# Patient Record
Sex: FEMALE | Race: WHITE | NOT HISPANIC OR LATINO | Employment: OTHER | URBAN - NONMETROPOLITAN AREA
[De-identification: names, ages, dates, MRNs, and addresses within clinical notes are randomized per-mention and may not be internally consistent; named-entity substitution may affect disease eponyms.]

---

## 2017-01-24 ENCOUNTER — OFFICE VISIT (OUTPATIENT)
Dept: ORTHOPEDIC SURGERY | Facility: CLINIC | Age: 67
End: 2017-01-24

## 2017-01-24 DIAGNOSIS — M81.0 OSTEOPOROSIS: Primary | ICD-10-CM

## 2017-01-24 PROCEDURE — 96372 THER/PROPH/DIAG INJ SC/IM: CPT | Performed by: ORTHOPAEDIC SURGERY

## 2017-01-24 NOTE — MR AVS SNAPSHOT
Ayleen Ruiz   1/24/2017 8:00 AM   Office Visit    Dept Phone:  [phone]   Encounter #:  [id number]    Provider:  Giuseppe Sheridan MD   Department:  [facility] ORTHOPEDICS                Your Full Care Plan              Today's Medication Changes          These changes are accurate as of: 1/24/17  8:46 AM.  If you have any questions, ask your nurse or doctor.               Stop taking medication(s)listed here:     DENOSUMAB SC   Stopped by:  Giuseppe Sheridan MD                      Your Updated Medication List          This list is accurate as of: 1/24/17  8:46 AM.  Always use your most recent med list.                ATORVASTATIN CALCIUM PO       GLUCOSAMINE-VITAMIN D PO       VITAMIN D3 PO               You Were Diagnosed With        Codes Comments    Osteoporosis    -  Primary ICD-10-CM: M81.0  ICD-9-CM: 733.00       Medications to be Given to You by a Medical Professional     Due       Frequency    (none) denosumab (PROLIA) syringe 60 mg  Once      Instructions     None    Patient Instructions History      Upcoming Appointments     Visit Type Date Time Department    FOLLOW UP 1/24/2017  8:00 AM [facility]    OFFICE VISIT 7/13/2017  1:00 PM [facility] OPHTHALMOLOGY [facility]    INJECTION 7/27/2017  8:00 AM [facility]      MyChart Signup     Our records indicate that you have declined [facility]. If you would like to sign up for [facility], please email [facility] [email or web address] or call [phone] to obtain an activation code.             Other Info from Your Visit           Your Appointments     Jul 13, 2017  1:00 PM CDT   Office Visit with Bubba Browning MD   [facility] OPHTHALMOLOGY (--)    [address] [place] [address] Dr  [facility] [address] [place] [id number] [phone]           Arrive 15 minutes prior to appointment.            Jul 27, 2017  8:00 AM CDT   Injection with Giuseppe Juárez  MD Fatimah   [facility] ORTHOPEDICS (--)    [address]  [facility] [address] [phone]              Allergies     No Known Allergies      Reason for Visit     Injections prolia injection      Vital Signs     Smoking Status                   Never Smoker           Problems and Diagnoses Noted     Derangement of meniscus of knee    Loose body of knee    Osteoporosis    High cholesterol      Medications Administered     denosumab (PROLIA) syringe 60 mg

## 2017-01-24 NOTE — PROGRESS NOTES
Chief Complaint   Patient presents with   • Injections     prolia injection     Tracey Calles MD    HPI:  Patient returns for a prolia injection.  No new complaints    Scheduled Meds:  Continuous Infusions:  No current facility-administered medications for this visit.   PRN Meds:.  No Known Allergies    Review of Systems   Constitutional: Negative.    HENT: Negative.    Eyes: Negative.    Respiratory: Negative.    Cardiovascular: Negative.    Gastrointestinal: Negative.    Endocrine: Negative.    Genitourinary: Negative.    Musculoskeletal: Negative.    Skin: Negative.    Allergic/Immunologic: Negative.    Neurological: Negative.    Hematological: Negative.    Psychiatric/Behavioral: Negative.          There were no vitals filed for this visit.    Physical Exam    Bone Density 7/14/16   T Score   Hip -2.5  Spine -1.2      ASSESSMENT:    Diagnoses and all orders for this visit:    Osteoporosis  -     denosumab (PROLIA) syringe 60 mg; Inject 1 mL under the skin 1 (One) Time.        Plan:    The patient was injected, under aseptic conditions, into the:    []  Right arm    []  Left arm  [x]  Right thigh    []  Left thigh  []  Right side of abdomen  []  Left side of abdomen    The patient tolerated the procedure well.  Plan Followup in 6 months for next injection.    Return in about 6 months (around 7/24/2017) for prolia injection.  will need a new bone density at next visit..    Giuseppe Sheridan MD      [id number]

## 2017-02-07 DIAGNOSIS — M81.0 OSTEOPOROSIS, UNSPECIFIED: Primary | ICD-10-CM

## 2017-07-27 ENCOUNTER — CLINICAL SUPPORT (OUTPATIENT)
Dept: ORTHOPEDIC SURGERY | Facility: CLINIC | Age: 67
End: 2017-07-27

## 2017-07-27 VITALS — WEIGHT: 194 LBS | HEIGHT: 70 IN | BODY MASS INDEX: 27.77 KG/M2

## 2017-07-27 DIAGNOSIS — M81.0 OSTEOPOROSIS: Primary | ICD-10-CM

## 2017-07-27 PROCEDURE — 96372 THER/PROPH/DIAG INJ SC/IM: CPT | Performed by: ORTHOPAEDIC SURGERY

## 2017-07-27 NOTE — PROGRESS NOTES
"Chief Complaint   Patient presents with   • Follow-up     osteoporosis   • Injections     prolia     Tracey Calles MD    HPI:  Patient returns for a prolia injection.  No new complaints      Current Outpatient Prescriptions:   •  ATORVASTATIN CALCIUM PO, Take  by mouth., Disp: , Rfl:   •  Cholecalciferol (VITAMIN D3 PO), Take  by mouth. vit D3 & K-berberine-hops, Disp: , Rfl:   •  GLUCOSAMINE-VITAMIN D PO, Take  by mouth. glucosamine-D3-boswellia serr, Disp: , Rfl:   No current facility-administered medications for this visit.     No Known Allergies      Vitals:    07/27/17 0756   Weight: 194 lb (88 kg)   Height: 70\" (177.8 cm)         ASSESSMENT:    Diagnoses and all orders for this visit:    Osteoporosis  -     denosumab (PROLIA) syringe 60 mg; Inject 1 mL under the skin 1 (One) Time.    Bone Density Exam done 7/24/17   Results:   L spine -0.9  (-1.2, -1.6)  Left Hip -3.0 (-2.5, -2.5)    Plan:    Reviewed bone density result with patient.  Discussed healthy lifestyle options.  Continue activity as tolerated.    The patient was injected, under aseptic conditions, into the:    []  Right arm    []  Left arm  [x]  Right thigh    []  Left thigh  []  Right side of abdomen  []  Left side of abdomen    The patient tolerated the procedure well.  Plan Followup in 6 months for next injection.    NDC #: 61356-713-62      [id number]    "

## 2017-08-30 ENCOUNTER — CONSULT (OUTPATIENT)
Dept: SURGERY | Facility: CLINIC | Age: 67
End: 2017-08-30

## 2017-08-30 VITALS
WEIGHT: 202 LBS | BODY MASS INDEX: 28.92 KG/M2 | SYSTOLIC BLOOD PRESSURE: 138 MMHG | HEIGHT: 70 IN | DIASTOLIC BLOOD PRESSURE: 70 MMHG

## 2017-08-30 DIAGNOSIS — R92.8 ABNORMAL MAMMOGRAM OF LEFT BREAST: Primary | ICD-10-CM

## 2017-08-30 PROCEDURE — 99203 OFFICE O/P NEW LOW 30 MIN: CPT | Performed by: SURGERY

## 2017-08-30 RX ORDER — CLOTRIMAZOLE AND BETAMETHASONE DIPROPIONATE 10; .64 MG/G; MG/G
CREAM TOPICAL AS NEEDED
COMMUNITY
Start: 2017-08-15 | End: 2018-08-07

## 2017-08-30 RX ORDER — FLUCONAZOLE 150 MG/1
1 TABLET ORAL AS NEEDED
Refills: 5 | COMMUNITY
Start: 2017-08-15 | End: 2021-03-27

## 2017-08-30 NOTE — PROGRESS NOTES
Chief Complaint   Patient presents with   • Follow-up     Recheck abnormal breast and mammogram.        HPI  Abnormal LEFT side. No newly palpable masses, skin dimpling, nipple discharge, axillary adenopathy, breast pain.    Study Result      PROCEDURE: Left unilateral diagnostic mammogram with 3-D  tomosynthesis with CAD and left breast ultrasound     REASON FOR EXAM: Abnormal screening mammography     FINDINGS: Comparison study dated 8/23/2017, 8/25/2016, 2/16/2016,  8/6/2015, and 7/10/2014.Almost complete fatty involutional  changes of the left breast. Patient presents for workup of left  breast small asymmetric density with associated calcifications  involving the upper inner quadrant.. The lesion was further  characterized with spot mag imaging. On spot mag imaging, the  calcifications appear pleomorphic and are fairly tightly  clustered. This lesion was further evaluated with ultrasound. On  ultrasound the lesion is seen to represent a small vague  hypoechoic solid lesion with mild acoustic shadowing which  measures 0.6 x 0.37 x 0.65 cm.     Parenchymal pattern: Almost complete fatty involutional changes     IMPRESSION:  Left breast upper inner quadrant asymmetric density with  associated pleomorphic calcifications which would be suspicious  for possible malignancy. Recommend biopsy to further evaluate.  Patient was notified of findings and requests appointment at  outside facility.     BI-RADS category 4: Suspicious abnormality     Recommendation: Stereotactic  biopsy.      Electronically signed by:  Constantino Renteria MD  8/29/2017 3:49 PM CDT  Workstation: [id number]       Past Medical History:   Diagnosis Date   • Acute bronchitis    • Acute maxillary sinusitis    • Acute pharyngitis    • Allergic rhinitis    • Astigmatism    • Derangement of meniscus 9/19/2009   • Food allergy     spinach   • Hypermetropia    • Loose body of knee 9/19/2009   • Need for immunization against influenza    • Osteoporosis 7/10/2014   •  Presbyopia    • Vulvovaginitis        Past Surgical History:   Procedure Laterality Date   • LAPAROSCOPIC CHOLECYSTECTOMY     • WRIST SURGERY           Current Outpatient Prescriptions:   •  ATORVASTATIN CALCIUM PO, Take  by mouth., Disp: , Rfl:   •  Cholecalciferol (VITAMIN D3 PO), Take  by mouth. vit D3 & K-berberine-hops, Disp: , Rfl:   •  GLUCOSAMINE-VITAMIN D PO, Take  by mouth. glucosamine-D3-boswellia serr, Disp: , Rfl:   •  Multiple Vitamins-Minerals (MULTIVITAMIN ADULT PO), Take 1 tablet by mouth Daily., Disp: , Rfl:   •  clotrimazole-betamethasone (LOTRISONE) 1-0.05 % cream, Apply  topically As Needed., Disp: , Rfl:   •  fluconazole (DIFLUCAN) 150 MG tablet, Take 1 tablet by mouth As Needed., Disp: , Rfl: 5    No Known Allergies    Family History   Problem Relation Age of Onset   • Breast cancer Maternal Aunt        Social History     Social History   • Marital status:      Spouse name: N/A   • Number of children: N/A   • Years of education: N/A     Occupational History   • Not on file.     Social History Main Topics   • Smoking status: Never Smoker   • Smokeless tobacco: Never Used   • Alcohol use Not on file   • Drug use: Not on file   • Sexual activity: Not on file     Other Topics Concern   • Not on file     Social History Narrative       Review of Systems   Constitutional: Negative for appetite change, chills, fever and unexpected weight change.   HENT: Negative for hearing loss, nosebleeds and trouble swallowing.    Eyes: Negative for visual disturbance.   Respiratory: Negative for apnea, cough, choking, chest tightness, shortness of breath, wheezing and stridor.    Cardiovascular: Negative for chest pain, palpitations and leg swelling.   Gastrointestinal: Negative for abdominal distention, abdominal pain, blood in stool, constipation, diarrhea, nausea and vomiting.   Endocrine: Negative for cold intolerance, heat intolerance, polydipsia, polyphagia and polyuria.   Genitourinary: Negative for  difficulty urinating, dysuria, frequency, hematuria and urgency.   Musculoskeletal: Negative for arthralgias, back pain, myalgias and neck pain.   Skin: Negative for color change, pallor and rash.   Allergic/Immunologic: Negative for immunocompromised state.   Neurological: Negative for dizziness, seizures, syncope, light-headedness, numbness and headaches.   Hematological: Negative for adenopathy.   Psychiatric/Behavioral: Negative for suicidal ideas. The patient is not nervous/anxious.        Physical Exam   Constitutional: She appears well-developed and well-nourished.   HENT:   Head: Normocephalic and atraumatic.   Eyes: EOM are normal. Pupils are equal, round, and reactive to light.   Neck: Normal range of motion. Neck supple. No JVD present. No tracheal deviation present. No thyromegaly present.   Cardiovascular: Normal rate and regular rhythm.    Pulmonary/Chest: Effort normal and breath sounds normal. No stridor. No respiratory distress. She has no wheezes. She has no rales. Right breast exhibits no inverted nipple, no mass, no nipple discharge, no skin change and no tenderness. Left breast exhibits no inverted nipple, no mass, no nipple discharge, no skin change and no tenderness. Breasts are symmetrical.   Lymphadenopathy:     She has no cervical adenopathy.     She has no axillary adenopathy.   Skin: Skin is warm, dry and intact. No lesion noted. No erythema.   Psychiatric: She has a normal mood and affect. Her speech is normal and behavior is normal. Judgment and thought content normal. Cognition and memory are normal.   Vitals reviewed.        ASSESSMENT    Ayleen was seen today for follow-up.    Diagnoses and all orders for this visit:    Abnormal mammogram of left breast  Comments:  Microcalcifications  Orders:  -     Mammo Stereotactic Breast Biopsy Surgical 1st Left; Future        PLAN    1. LEFT stereotactic mammotome    The procedure is explained to the patient. The patient will be placed prone  for the procedure. A small incision will be made under local anesthesia, and a special, large needle will be used to perform the biopsy under ultrasound guidance.   A permanent titanium clip will be placed in the breast to barrett the site of biopsy should further surgery be necessary.  The risks of bleeding/bruising, infection, the possible need for open biopsy or other procedure, scarring, and poor wound healing are explained. There is a low transfusion risk. The risks of chronic pain are, likewise, low.   Alternatives include open biopsy in the operating room under local anesthesia with moderate sedation or general anesthesia or simply watching the lesion to see if there is change. These are not currently suggested.    She understands and agrees to the procedure.          This document has been electronically signed by Sabas Silvestre MD on August 30, 2017 10:22 AM

## 2017-09-08 ENCOUNTER — HOSPITAL ENCOUNTER (OUTPATIENT)
Dept: MAMMOGRAPHY | Facility: HOSPITAL | Age: 67
Discharge: HOME | End: 2017-09-08
Admitting: SURGERY

## 2017-09-08 VITALS
DIASTOLIC BLOOD PRESSURE: 68 MMHG | BODY MASS INDEX: 28.12 KG/M2 | OXYGEN SATURATION: 98 % | SYSTOLIC BLOOD PRESSURE: 146 MMHG | TEMPERATURE: 97.1 F | HEART RATE: 85 BPM | RESPIRATION RATE: 18 BRPM | WEIGHT: 196.43 LBS | HEIGHT: 70 IN

## 2017-09-08 DIAGNOSIS — R92.8 ABNORMAL MAMMOGRAM OF LEFT BREAST: ICD-10-CM

## 2017-09-08 PROCEDURE — A4648 IMPLANTABLE TISSUE MARKER: HCPCS

## 2017-09-08 PROCEDURE — 63710000001 DIAZEPAM 5 MG TABLET: Performed by: SURGERY

## 2017-09-08 PROCEDURE — 88305 TISSUE EXAM BY PATHOLOGIST: CPT | Performed by: SURGERY

## 2017-09-08 PROCEDURE — 88305 TISSUE EXAM BY PATHOLOGIST: CPT | Performed by: PATHOLOGY

## 2017-09-08 PROCEDURE — A9270 NON-COVERED ITEM OR SERVICE: HCPCS | Performed by: SURGERY

## 2017-09-08 PROCEDURE — 63710000001 OXYCODONE-ACETAMINOPHEN 5-325 MG TABLET: Performed by: SURGERY

## 2017-09-08 PROCEDURE — 19081 BX BREAST 1ST LESION STRTCTC: CPT | Performed by: SURGERY

## 2017-09-08 RX ORDER — DIPHENHYDRAMINE HCL 25 MG
25 CAPSULE ORAL NIGHTLY
COMMUNITY

## 2017-09-08 RX ORDER — CHLORPHENIRAMINE MALEATE 4 MG/1
4 TABLET ORAL DAILY
COMMUNITY

## 2017-09-08 RX ORDER — OXYCODONE HYDROCHLORIDE AND ACETAMINOPHEN 5; 325 MG/1; MG/1
1-2 TABLET ORAL ONCE
Status: CANCELLED | OUTPATIENT
Start: 2017-09-08

## 2017-09-08 RX ORDER — OXYCODONE HYDROCHLORIDE AND ACETAMINOPHEN 5; 325 MG/1; MG/1
1 TABLET ORAL ONCE
Status: COMPLETED | OUTPATIENT
Start: 2017-09-08 | End: 2017-09-08

## 2017-09-08 RX ORDER — LIDOCAINE HYDROCHLORIDE 10 MG/ML
20 INJECTION, SOLUTION INFILTRATION; PERINEURAL ONCE
Status: COMPLETED | OUTPATIENT
Start: 2017-09-08 | End: 2017-09-08

## 2017-09-08 RX ORDER — DIAZEPAM 5 MG/1
5 TABLET ORAL ONCE
Status: CANCELLED | OUTPATIENT
Start: 2017-09-08

## 2017-09-08 RX ORDER — DIAZEPAM 5 MG/1
5 TABLET ORAL ONCE
Status: COMPLETED | OUTPATIENT
Start: 2017-09-08 | End: 2017-09-08

## 2017-09-08 RX ADMIN — LIDOCAINE HYDROCHLORIDE 20 ML: 10 INJECTION, SOLUTION INFILTRATION; PERINEURAL at 07:41

## 2017-09-08 RX ADMIN — OXYCODONE HYDROCHLORIDE AND ACETAMINOPHEN 1 TABLET: 5; 325 TABLET ORAL at 06:01

## 2017-09-08 RX ADMIN — DIAZEPAM 5 MG: 5 TABLET ORAL at 06:01

## 2017-09-08 NOTE — PLAN OF CARE
Problem: Patient Care Overview (Adult)  Goal: Plan of Care Review  Outcome: Outcome(s) achieved Date Met:  09/08/17  Discharge criteria met

## 2017-09-08 NOTE — H&P (VIEW-ONLY)
Chief Complaint   Patient presents with   • Follow-up     Recheck abnormal breast and mammogram.        HPI  Abnormal LEFT side. No newly palpable masses, skin dimpling, nipple discharge, axillary adenopathy, breast pain.    Study Result      PROCEDURE: Left unilateral diagnostic mammogram with 3-D  tomosynthesis with CAD and left breast ultrasound     REASON FOR EXAM: Abnormal screening mammography     FINDINGS: Comparison study dated 8/23/2017, 8/25/2016, 2/16/2016,  8/6/2015, and 7/10/2014.Almost complete fatty involutional  changes of the left breast. Patient presents for workup of left  breast small asymmetric density with associated calcifications  involving the upper inner quadrant.. The lesion was further  characterized with spot mag imaging. On spot mag imaging, the  calcifications appear pleomorphic and are fairly tightly  clustered. This lesion was further evaluated with ultrasound. On  ultrasound the lesion is seen to represent a small vague  hypoechoic solid lesion with mild acoustic shadowing which  measures 0.6 x 0.37 x 0.65 cm.     Parenchymal pattern: Almost complete fatty involutional changes     IMPRESSION:  Left breast upper inner quadrant asymmetric density with  associated pleomorphic calcifications which would be suspicious  for possible malignancy. Recommend biopsy to further evaluate.  Patient was notified of findings and requests appointment at  outside facility.     BI-RADS category 4: Suspicious abnormality     Recommendation: Stereotactic  biopsy.      Electronically signed by:  Constantino Renteria MD  8/29/2017 3:49 PM CDT  Workstation: [id number]       Past Medical History:   Diagnosis Date   • Acute bronchitis    • Acute maxillary sinusitis    • Acute pharyngitis    • Allergic rhinitis    • Astigmatism    • Derangement of meniscus 9/19/2009   • Food allergy     spinach   • Hypermetropia    • Loose body of knee 9/19/2009   • Need for immunization against influenza    • Osteoporosis 7/10/2014   •  Presbyopia    • Vulvovaginitis        Past Surgical History:   Procedure Laterality Date   • LAPAROSCOPIC CHOLECYSTECTOMY     • WRIST SURGERY           Current Outpatient Prescriptions:   •  ATORVASTATIN CALCIUM PO, Take  by mouth., Disp: , Rfl:   •  Cholecalciferol (VITAMIN D3 PO), Take  by mouth. vit D3 & K-berberine-hops, Disp: , Rfl:   •  GLUCOSAMINE-VITAMIN D PO, Take  by mouth. glucosamine-D3-boswellia serr, Disp: , Rfl:   •  Multiple Vitamins-Minerals (MULTIVITAMIN ADULT PO), Take 1 tablet by mouth Daily., Disp: , Rfl:   •  clotrimazole-betamethasone (LOTRISONE) 1-0.05 % cream, Apply  topically As Needed., Disp: , Rfl:   •  fluconazole (DIFLUCAN) 150 MG tablet, Take 1 tablet by mouth As Needed., Disp: , Rfl: 5    No Known Allergies    Family History   Problem Relation Age of Onset   • Breast cancer Maternal Aunt        Social History     Social History   • Marital status:      Spouse name: N/A   • Number of children: N/A   • Years of education: N/A     Occupational History   • Not on file.     Social History Main Topics   • Smoking status: Never Smoker   • Smokeless tobacco: Never Used   • Alcohol use Not on file   • Drug use: Not on file   • Sexual activity: Not on file     Other Topics Concern   • Not on file     Social History Narrative       Review of Systems   Constitutional: Negative for appetite change, chills, fever and unexpected weight change.   HENT: Negative for hearing loss, nosebleeds and trouble swallowing.    Eyes: Negative for visual disturbance.   Respiratory: Negative for apnea, cough, choking, chest tightness, shortness of breath, wheezing and stridor.    Cardiovascular: Negative for chest pain, palpitations and leg swelling.   Gastrointestinal: Negative for abdominal distention, abdominal pain, blood in stool, constipation, diarrhea, nausea and vomiting.   Endocrine: Negative for cold intolerance, heat intolerance, polydipsia, polyphagia and polyuria.   Genitourinary: Negative for  difficulty urinating, dysuria, frequency, hematuria and urgency.   Musculoskeletal: Negative for arthralgias, back pain, myalgias and neck pain.   Skin: Negative for color change, pallor and rash.   Allergic/Immunologic: Negative for immunocompromised state.   Neurological: Negative for dizziness, seizures, syncope, light-headedness, numbness and headaches.   Hematological: Negative for adenopathy.   Psychiatric/Behavioral: Negative for suicidal ideas. The patient is not nervous/anxious.        Physical Exam   Constitutional: She appears well-developed and well-nourished.   HENT:   Head: Normocephalic and atraumatic.   Eyes: EOM are normal. Pupils are equal, round, and reactive to light.   Neck: Normal range of motion. Neck supple. No JVD present. No tracheal deviation present. No thyromegaly present.   Cardiovascular: Normal rate and regular rhythm.    Pulmonary/Chest: Effort normal and breath sounds normal. No stridor. No respiratory distress. She has no wheezes. She has no rales. Right breast exhibits no inverted nipple, no mass, no nipple discharge, no skin change and no tenderness. Left breast exhibits no inverted nipple, no mass, no nipple discharge, no skin change and no tenderness. Breasts are symmetrical.   Lymphadenopathy:     She has no cervical adenopathy.     She has no axillary adenopathy.   Skin: Skin is warm, dry and intact. No lesion noted. No erythema.   Psychiatric: She has a normal mood and affect. Her speech is normal and behavior is normal. Judgment and thought content normal. Cognition and memory are normal.   Vitals reviewed.        ASSESSMENT    Ayleen was seen today for follow-up.    Diagnoses and all orders for this visit:    Abnormal mammogram of left breast  Comments:  Microcalcifications  Orders:  -     Mammo Stereotactic Breast Biopsy Surgical 1st Left; Future        PLAN    1. LEFT stereotactic mammotome    The procedure is explained to the patient. The patient will be placed prone  for the procedure. A small incision will be made under local anesthesia, and a special, large needle will be used to perform the biopsy under ultrasound guidance.   A permanent titanium clip will be placed in the breast to barrett the site of biopsy should further surgery be necessary.  The risks of bleeding/bruising, infection, the possible need for open biopsy or other procedure, scarring, and poor wound healing are explained. There is a low transfusion risk. The risks of chronic pain are, likewise, low.   Alternatives include open biopsy in the operating room under local anesthesia with moderate sedation or general anesthesia or simply watching the lesion to see if there is change. These are not currently suggested.    She understands and agrees to the procedure.          This document has been electronically signed by Sabas Silvestre MD on August 30, 2017 10:22 AM

## 2017-09-11 LAB
LAB AP CASE REPORT: NORMAL
Lab: NORMAL
PATH REPORT.FINAL DX SPEC: NORMAL
PATH REPORT.GROSS SPEC: NORMAL

## 2017-09-15 ENCOUNTER — OFFICE VISIT (OUTPATIENT)
Dept: SURGERY | Facility: CLINIC | Age: 67
End: 2017-09-15

## 2017-09-15 VITALS
SYSTOLIC BLOOD PRESSURE: 120 MMHG | DIASTOLIC BLOOD PRESSURE: 80 MMHG | HEIGHT: 70 IN | BODY MASS INDEX: 28.63 KG/M2 | WEIGHT: 200 LBS

## 2017-09-15 DIAGNOSIS — R92.0 MICROCALCIFICATION OF LEFT BREAST ON MAMMOGRAM: Primary | ICD-10-CM

## 2017-09-15 PROCEDURE — 99212 OFFICE O/P EST SF 10 MIN: CPT | Performed by: SURGERY

## 2017-09-15 NOTE — PROGRESS NOTES
Chief Complaint   Patient presents with   • Follow-up     Recheck left breast mammotone 9-8-17.        HPI  Final Diagnosis   LEFT BREAST, MAMMOTOME BIOPSY:  FIBROADENOMATOID CHANGE WITH STROMAL MICROCALCIFICATIONS.   Electronically signed by Mauricio Bruno MD on 9/11/2017 at 1003   Gross Description       Multiple large bore needle biopsies of fatty tissue measure 3.0 x 7.5 x 0.4 cm, together.  This includes some blood clot.  Totally submitted in five blocks.                 Current Outpatient Prescriptions:   •  ATORVASTATIN CALCIUM PO, Take 20 tablets by mouth Every Night., Disp: , Rfl:   •  B Complex-C-E-Zn (B COMPLEX-C-E-ZINC) tablet, Take 1 tablet by mouth Daily., Disp: , Rfl:   •  chlorpheniramine (CHLOR-TRIMETON) 4 MG tablet, Take 4 mg by mouth Daily., Disp: , Rfl:   •  Cholecalciferol (VITAMIN D3 PO), Take 1 tablet by mouth. vit D3 & K-berberine-hops  , Disp: , Rfl:   •  clotrimazole-betamethasone (LOTRISONE) 1-0.05 % cream, Apply  topically As Needed., Disp: , Rfl:   •  denosumab (PROLIA) 60 MG/ML solution syringe, Inject  under the skin 2 (Two) Times a Day With Meals. Twice yearly, Disp: , Rfl:   •  diphenhydrAMINE (BENADRYL) 25 mg capsule, Take 25 mg by mouth Every Night., Disp: , Rfl:   •  fluconazole (DIFLUCAN) 150 MG tablet, Take 1 tablet by mouth As Needed., Disp: , Rfl: 5  •  GLUCOSAMINE-VITAMIN D PO, Take 1 tablet by mouth 2 (Two) Times a Day. glucosamine-D3-boswellia serr , Disp: , Rfl:   •  Multiple Vitamins-Minerals (MULTIVITAMIN ADULT PO), Take 1 tablet by mouth Daily., Disp: , Rfl:     No Known Allergies    Review of Systems  Doing well- no complaints. No pain, no hematoma  Physical Exam   Pulmonary/Chest:             ASSESSMENT    Ayleen was seen today for follow-up.    Diagnoses and all orders for this visit:    Microcalcification of left breast on mammogram  -     Mammo Diagnostic Left With CAD; Future        PLAN    1. Recheck in 6 weeks  2. Mammogram in 6 weeks          This document has  been electronically signed by Sabas Silvestre MD on September 15, 2017 9:15 AM

## 2017-10-02 NOTE — PROGRESS NOTES
Patient has been notified that future injections for prolia injections will be done through the [facility] center.

## 2017-10-11 ENCOUNTER — TELEPHONE (OUTPATIENT)
Dept: ORTHOPEDIC SURGERY | Facility: CLINIC | Age: 67
End: 2017-10-11

## 2017-11-01 ENCOUNTER — OFFICE VISIT (OUTPATIENT)
Dept: SURGERY | Facility: CLINIC | Age: 67
End: 2017-11-01

## 2017-11-01 VITALS
DIASTOLIC BLOOD PRESSURE: 70 MMHG | HEIGHT: 70 IN | WEIGHT: 209 LBS | SYSTOLIC BLOOD PRESSURE: 130 MMHG | BODY MASS INDEX: 29.92 KG/M2

## 2017-11-01 DIAGNOSIS — R92.8 ABNORMAL MAMMOGRAM OF LEFT BREAST: Primary | ICD-10-CM

## 2017-11-01 PROCEDURE — 99213 OFFICE O/P EST LOW 20 MIN: CPT | Performed by: SURGERY

## 2017-11-01 NOTE — PROGRESS NOTES
Chief Complaint   Patient presents with   • Follow-up     Recheck left breast and mammograms.        HPI  6 week recheck after mammotome bx:  Final Diagnosis   LEFT BREAST, MAMMOTOME BIOPSY:  FIBROADENOMATOID CHANGE WITH STROMAL MICROCALCIFICATIONS.   Electronically signed by Mauricio Bruno MD on 9/11/2017 at 1003   Gross Description         Multiple large bore needle biopsies of fatty tissue measure 3.0 x 7.5 x 0.4 cm, together.  This includes some blood clot.  Totally submitted in five blocks   Doing well- no palpable masses or pain.    Study Result      PROCEDURE: Digital diagnostic mammogram of the left breast.     HISTORY: Postbiopsy follow-up     COMPARISON: 8/29/2017, 8/23/2017     NOTE:   Computer-aided detection was utilized during this exam.   Digital breast tomosynthesis was performed.     FINDINGS: CC and MLO views were obtained of the left breast  demonstrating a microclip in area of the previously noted  calcifications. The calcifications are no longer visualized.  There is a microclip with an irregular shaped surrounding soft  soft tissue, likely representing a hematoma.     IMPRESSION:  CONCLUSION:    1. Postbiopsy follow-up demonstrating a microclip and an  irregular shaped soft tissue hematoma. Recommend six month  follow-up left breast mammogram to ensure resolution.     2. BI-RADS Category 3:  Probably benign findings. Initial short  interval follow-up suggested.     Electronically signed by:  Fernando Patino MD  10/30/2017 9:48 AM  CDT Workstation: [id number]     ( I have personally reviewed the breast imaging and concur with the findings of the radiologist- BiRADS 3)  Past Medical History:   Diagnosis Date   • Acute bronchitis    • Acute maxillary sinusitis    • Acute pharyngitis    • Allergic rhinitis    • Astigmatism    • Derangement of meniscus 9/19/2009   • Food allergy     spinach   • Hypermetropia    • Loose body of knee 9/19/2009   • Need for immunization against influenza    • Osteoporosis  7/10/2014   • Presbyopia    • Vulvovaginitis        Past Surgical History:   Procedure Laterality Date   • LAPAROSCOPIC CHOLECYSTECTOMY     • WRIST SURGERY           Current Outpatient Prescriptions:   •  ATORVASTATIN CALCIUM PO, Take 20 tablets by mouth Every Night., Disp: , Rfl:   •  B Complex-C-E-Zn (B COMPLEX-C-E-ZINC) tablet, Take 1 tablet by mouth Daily., Disp: , Rfl:   •  chlorpheniramine (CHLOR-TRIMETON) 4 MG tablet, Take 4 mg by mouth Daily., Disp: , Rfl:   •  Cholecalciferol (VITAMIN D3 PO), Take 1 tablet by mouth. vit D3 & K-berberine-hops  , Disp: , Rfl:   •  clotrimazole-betamethasone (LOTRISONE) 1-0.05 % cream, Apply  topically As Needed., Disp: , Rfl:   •  denosumab (PROLIA) 60 MG/ML solution syringe, Inject  under the skin 2 (Two) Times a Day With Meals. Twice yearly, Disp: , Rfl:   •  diphenhydrAMINE (BENADRYL) 25 mg capsule, Take 25 mg by mouth Every Night., Disp: , Rfl:   •  fluconazole (DIFLUCAN) 150 MG tablet, Take 1 tablet by mouth As Needed., Disp: , Rfl: 5  •  GLUCOSAMINE-VITAMIN D PO, Take 1 tablet by mouth 2 (Two) Times a Day. glucosamine-D3-boswellia serr , Disp: , Rfl:   •  Multiple Vitamins-Minerals (MULTIVITAMIN ADULT PO), Take 1 tablet by mouth Daily., Disp: , Rfl:     No Known Allergies    Family History   Problem Relation Age of Onset   • Breast cancer Maternal Aunt        Social History     Social History   • Marital status:      Spouse name: N/A   • Number of children: 2- age 36, 34     Social History Main Topics   • Smoking status: Former Smoker   • Smokeless tobacco: Never Used   • Alcohol use No   • Drug use: No         Review of Systems  Nothing to add  Physical Exam   Neck: Normal range of motion. Neck supple. No JVD present. No tracheal deviation present. No thyromegaly present.   Pulmonary/Chest: No stridor. Right breast exhibits no inverted nipple, no mass, no nipple discharge, no skin change and no tenderness. Left breast exhibits no inverted nipple, no mass, no  nipple discharge, no skin change and no tenderness. Breasts are symmetrical.       Lymphadenopathy:     She has no cervical adenopathy.     She has no axillary adenopathy.         ASSESSMENT    Ayleen was seen today for follow-up.    Diagnoses and all orders for this visit:    Abnormal mammogram of left breast      PLAN    1. Recheck in 6 month with LEFT mammogram          This document has been electronically signed by Sabas Silvestre MD on November 1, 2017 10:00 AM

## 2017-11-02 DIAGNOSIS — R92.8 ABNORMAL MAMMOGRAM: Primary | ICD-10-CM

## 2018-01-30 ENCOUNTER — INFUSION (OUTPATIENT)
Dept: ONCOLOGY | Facility: HOSPITAL | Age: 68
End: 2018-01-30

## 2018-01-30 DIAGNOSIS — M81.0 OSTEOPOROSIS, UNSPECIFIED OSTEOPOROSIS TYPE, UNSPECIFIED PATHOLOGICAL FRACTURE PRESENCE: Primary | ICD-10-CM

## 2018-01-30 LAB — CALCIUM SPEC-SCNC: 9.9 MG/DL (ref 8.4–10.2)

## 2018-01-30 PROCEDURE — 82310 ASSAY OF CALCIUM: CPT

## 2018-01-30 PROCEDURE — 96372 THER/PROPH/DIAG INJ SC/IM: CPT | Performed by: NURSE PRACTITIONER

## 2018-01-30 PROCEDURE — 36415 COLL VENOUS BLD VENIPUNCTURE: CPT | Performed by: NURSE PRACTITIONER

## 2018-01-30 PROCEDURE — 25010000002 DENOSUMAB 60 MG/ML SOLUTION: Performed by: ORTHOPAEDIC SURGERY

## 2018-01-30 RX ADMIN — DENOSUMAB 60 MG: 60 INJECTION SUBCUTANEOUS at 14:32

## 2018-03-16 ENCOUNTER — TELEPHONE (OUTPATIENT)
Dept: ORTHOPEDIC SURGERY | Facility: CLINIC | Age: 68
End: 2018-03-16

## 2018-05-08 ENCOUNTER — OFFICE VISIT (OUTPATIENT)
Dept: SURGERY | Facility: CLINIC | Age: 68
End: 2018-05-08

## 2018-05-08 VITALS
WEIGHT: 228.6 LBS | DIASTOLIC BLOOD PRESSURE: 60 MMHG | BODY MASS INDEX: 32.73 KG/M2 | HEIGHT: 70 IN | SYSTOLIC BLOOD PRESSURE: 120 MMHG

## 2018-05-08 DIAGNOSIS — R92.8 ABNORMAL MAMMOGRAM OF LEFT BREAST: Primary | ICD-10-CM

## 2018-05-08 PROCEDURE — 99213 OFFICE O/P EST LOW 20 MIN: CPT | Performed by: SURGERY

## 2018-05-08 RX ORDER — ATORVASTATIN CALCIUM 20 MG/1
20 TABLET, FILM COATED ORAL NIGHTLY
COMMUNITY
Start: 2018-03-02

## 2018-05-08 NOTE — PATIENT INSTRUCTIONS

## 2018-05-14 DIAGNOSIS — R92.8 ABNORMAL MAMMOGRAM: Primary | ICD-10-CM

## 2018-05-14 NOTE — PROGRESS NOTES
Chief Complaint: This is a 68 y.o. woman seen in consultation for abnormal breast imaging    History of Present Illness:  Patient has noted no new masses, skin changes, nipple discharge, nipple changes prior to her most recent imaging.  Her most recent imaging includes the following:  Study Result        PROCEDURE: Digital diagnostic mammogram of the left breast.     HISTORY: Six-month follow-up     COMPARISON: Prior exams dated 10/27/2017 through 7/10/2014     NOTE:   Computer-aided detection was utilized during this exam.   Digital breast tomosynthesis was performed.     FINDINGS: CC and MLO views were obtained of the left breast  demonstrating a microclip in the upper outer aspect, similar to  the prior exam. There is a surrounding spiculated density which  is only seen on the cc view, most likely representing hematoma or  scarring. The density appears smaller than on the prior exam.     IMPRESSION:  CONCLUSION:    1. Spiculated density surrounding the microclip in the upper  outer left breast, smaller than on the prior exam, most likely  representing a retracting scar or hematoma. Recommend six-month  follow-up at the time of yearly screening.     2. BI-RADS Category 3:  Probably benign findings. Initial short  interval follow-up suggested.     Electronically signed by:  Fernando Patino MD  5/1/2018 2:39 PM CDT  Workstation: [id number]         ( I have personally reviewed the breast imaging and concur with the findings of the radiologist- BiRADS 3)    Breast Biopsy History: Mammotome biopsy for benign disease 6 months ago. Here for follow up mammogram.  Breast Cancer HIstory: Patient does not have a past medical history of breast cancer.      Past Medical History:   Diagnosis Date   • Acute bronchitis    • Acute maxillary sinusitis    • Acute pharyngitis    • Allergic rhinitis    • Astigmatism    • Derangement of meniscus 9/19/2009   • Food allergy     spinach   • Hypermetropia    • Loose body of knee 9/19/2009   •  Need for immunization against influenza    • Osteoporosis 7/10/2014   • Presbyopia    • Vulvovaginitis      Past Surgical History:   Procedure Laterality Date   • LAPAROSCOPIC CHOLECYSTECTOMY     • WRIST SURGERY         Current Outpatient Prescriptions:   •  atorvastatin (LIPITOR) 20 MG tablet, , Disp: , Rfl:   •  B Complex-C-E-Zn (B COMPLEX-C-E-ZINC) tablet, Take 1 tablet by mouth Daily., Disp: , Rfl:   •  chlorpheniramine (CHLOR-TRIMETON) 4 MG tablet, Take 4 mg by mouth Daily., Disp: , Rfl:   •  Cholecalciferol (VITAMIN D3 PO), Take 1 tablet by mouth. vit D3 & K-berberine-hops  , Disp: , Rfl:   •  clotrimazole-betamethasone (LOTRISONE) 1-0.05 % cream, Apply  topically As Needed., Disp: , Rfl:   •  diphenhydrAMINE (BENADRYL) 25 mg capsule, Take 25 mg by mouth Every Night., Disp: , Rfl:   •  fluconazole (DIFLUCAN) 150 MG tablet, Take 1 tablet by mouth As Needed., Disp: , Rfl: 5  •  GLUCOSAMINE-VITAMIN D PO, Take 1 tablet by mouth 2 (Two) Times a Day. glucosamine-D3-boswellia serr , Disp: , Rfl:   •  Multiple Vitamins-Minerals (MULTIVITAMIN ADULT PO), Take 1 tablet by mouth Daily., Disp: , Rfl:   No Known Allergies  Family History   Problem Relation Age of Onset   • Breast cancer Maternal Aunt      Social History     Social History   • Marital status:      Spouse name: N/A   • Number of children: N/A   • Years of education: N/A     Occupational History   • Not on file.     Social History Main Topics   • Smoking status: Former Smoker   • Smokeless tobacco: Never Used   • Alcohol use Not on file   • Drug use: Unknown   • Sexual activity: Not on file     Other Topics Concern   • Not on file     Social History Narrative   • No narrative on file     Review of Systems   Constitutional: Negative for appetite change, chills, fever and unexpected weight change.   HENT: Negative for hearing loss, nosebleeds and trouble swallowing.    Eyes: Negative for visual disturbance.   Respiratory: Negative for apnea, cough,  choking, chest tightness, shortness of breath, wheezing and stridor.    Cardiovascular: Negative for chest pain, palpitations and leg swelling.   Gastrointestinal: Negative for abdominal distention, abdominal pain, blood in stool, constipation, diarrhea, nausea and vomiting.   Endocrine: Negative for cold intolerance, heat intolerance, polydipsia, polyphagia and polyuria.   Genitourinary: Negative for difficulty urinating, dysuria, frequency, hematuria and urgency.   Musculoskeletal: Negative for arthralgias, back pain, myalgias and neck pain.   Skin: Negative for color change, pallor and rash.   Allergic/Immunologic: Negative for immunocompromised state.   Neurological: Negative for dizziness, seizures, syncope, light-headedness, numbness and headaches.   Hematological: Negative for adenopathy.   Psychiatric/Behavioral: Negative for suicidal ideas. The patient is not nervous/anxious.      Physical Exam   Pulmonary/Chest: Right breast exhibits no inverted nipple, no mass, no nipple discharge, no skin change and no tenderness. Left breast exhibits no inverted nipple, no mass, no nipple discharge, no skin change and no tenderness. Breasts are symmetrical. There is no breast swelling.   Genitourinary: No breast tenderness, discharge or bleeding.   Lymphadenopathy:     She has no cervical adenopathy.     She has no axillary adenopathy.        Right: No supraclavicular adenopathy present.        Left: No supraclavicular adenopathy present.   Skin: Skin is warm, dry and intact. No lesion noted. No erythema.   Psychiatric: She has a normal mood and affect. Her speech is normal. Cognition and memory are normal.     Vitals:    05/08/18 1527   BP: 120/60     Assessment:    Ayleen was seen today for follow-up.    Diagnoses and all orders for this visit:    Abnormal mammogram of left breast        Plan:    1. Recheck in 6 months mammogram and exam        This document has been electronically signed by Sabas Silvestre MD on May  13, 2018 9:42 PM

## 2018-06-05 ENCOUNTER — TELEPHONE (OUTPATIENT)
Dept: ORTHOPEDIC SURGERY | Facility: CLINIC | Age: 68
End: 2018-06-05

## 2018-06-05 DIAGNOSIS — M81.0 OSTEOPOROSIS, UNSPECIFIED OSTEOPOROSIS TYPE, UNSPECIFIED PATHOLOGICAL FRACTURE PRESENCE: Primary | ICD-10-CM

## 2018-07-31 ENCOUNTER — LAB (OUTPATIENT)
Dept: ONCOLOGY | Facility: HOSPITAL | Age: 68
End: 2018-07-31

## 2018-07-31 DIAGNOSIS — M81.0 OSTEOPOROSIS, UNSPECIFIED OSTEOPOROSIS TYPE, UNSPECIFIED PATHOLOGICAL FRACTURE PRESENCE: Primary | ICD-10-CM

## 2018-07-31 PROCEDURE — 96372 THER/PROPH/DIAG INJ SC/IM: CPT

## 2018-07-31 PROCEDURE — 25010000002 DENOSUMAB 60 MG/ML SOLUTION: Performed by: ORTHOPAEDIC SURGERY

## 2018-07-31 RX ADMIN — DENOSUMAB 60 MG: 60 INJECTION SUBCUTANEOUS at 08:34

## 2018-08-07 ENCOUNTER — OFFICE VISIT (OUTPATIENT)
Dept: ORTHOPEDIC SURGERY | Facility: CLINIC | Age: 68
End: 2018-08-07

## 2018-08-07 VITALS — HEIGHT: 70 IN | WEIGHT: 214 LBS | BODY MASS INDEX: 30.64 KG/M2

## 2018-08-07 DIAGNOSIS — M81.0 OSTEOPOROSIS, UNSPECIFIED OSTEOPOROSIS TYPE, UNSPECIFIED PATHOLOGICAL FRACTURE PRESENCE: Primary | ICD-10-CM

## 2018-08-07 PROCEDURE — 99213 OFFICE O/P EST LOW 20 MIN: CPT | Performed by: ORTHOPAEDIC SURGERY

## 2018-08-07 NOTE — PROGRESS NOTES
"Ayleen Ruiz is a 68 y.o. female returns for     Chief Complaint   Patient presents with   • Osteoporosis     Follow up and bone density       HISTORY OF PRESENT ILLNESS: Patient being seen for one year follow up for osteoporosis. Bone density done 7/26/18. Patient currently taking prolia.        CONCURRENT MEDICAL HISTORY:    The following portions of the patient's history were reviewed and updated as appropriate: allergies, current medications, past family history, past medical history, past social history, past surgical history and problem list.     ROS  No fevers or chills.  No chest pain or shortness of air.  No GI or  disturbances.    PHYSICAL EXAMINATION:       Ht 177.8 cm (70\")   Wt 97.1 kg (214 lb)   BMI 30.71 kg/m²     Physical Exam   Constitutional: She is oriented to person, place, and time. She appears well-developed and well-nourished.   Neurological: She is alert and oriented to person, place, and time.   Psychiatric: She has a normal mood and affect. Her behavior is normal. Judgment and thought content normal.       GAIT:     [x]  Normal  []  Antalgic    Assistive device: [x]  None  []  Walker     []  Crutches  []  Cane     []  Wheelchair  []  Stretcher    Ortho Exam      Dexa Bone Density Axial    Result Date: 7/26/2018  Narrative: DEXA scan HISTORY: Osteoporosis screening postmenopausal Scans were obtained at the L1-L4 levels and of each hip. Average bone mineral density for the lumbar spine is 0.869 g/sq cm. The T score of -1.6 corresponds to a WHO of classification of osteopenia. The bone mineral density for the left femoral neck is 0.568 g/sq cm. The T score of -2.5 corresponds to a WHO classification of osteoporosis. Total left hip T score of -1.8 corresponds to osteopenia. The bone mineral density for the right femoral neck is 0.640 g/sq cm. The T score of -1.9 corresponds to a WHO classification of osteopenia. Total right hip T score of -1.8 also corresponds to osteopenia. "     Impression: CONCLUSION: Lumbar spine and right hip osteopenia. Fracture risk increased. Left femoral neck osteoporosis. Fracture risk high. [id number] Electronically signed by:  Don Perez MD  7/26/2018 10:25 AM CDT Workstation: [id number]            ASSESSMENT:    Diagnoses and all orders for this visit:    Osteoporosis, unspecified osteoporosis type, unspecified pathological fracture presence    Other orders  -     Ascorbic Acid (DOMINICK-C PO); Take  by mouth.  -     Multiple Vitamins-Minerals (ZINC PO); Take  by mouth.  -     Misc Natural Products (OSTEO BI-FLEX ADV DOUBLE ST PO); Take  by mouth.  -     denosumab (PROLIA) 60 MG/ML solution syringe; Inject  under the skin into the appropriate area as directed 1 (One) Time.          PLAN    Approximately 15 minutes was spent with the patient reviewing the bone scan results and prior bone scan results.  We discussed a healthy bone health lifestyle choices.  We discussed increased activity as well as calcium and vitamin D supplements.  Patient will follow up with the bone health clinic to help direct further treatment options.    Patient's Body mass index is 30.71 kg/m². BMI is above normal parameters. Recommendations include: exercise counseling and nutrition counseling.      Return if symptoms worsen or fail to improve, for recheck.    Giuseppe Sheridan MD

## 2018-11-26 ENCOUNTER — OFFICE VISIT (OUTPATIENT)
Dept: SURGERY | Facility: CLINIC | Age: 68
End: 2018-11-26

## 2018-11-26 VITALS
HEIGHT: 70 IN | DIASTOLIC BLOOD PRESSURE: 80 MMHG | HEART RATE: 81 BPM | SYSTOLIC BLOOD PRESSURE: 122 MMHG | WEIGHT: 199.8 LBS | TEMPERATURE: 98.3 F | BODY MASS INDEX: 28.6 KG/M2

## 2018-11-26 DIAGNOSIS — R92.8 ABNORMAL MAMMOGRAM OF LEFT BREAST: Primary | ICD-10-CM

## 2018-11-26 PROBLEM — Z87.898 HX OF ABNORMAL MAMMOGRAM: Status: ACTIVE | Noted: 2018-11-26

## 2018-11-26 PROCEDURE — 99213 OFFICE O/P EST LOW 20 MIN: CPT | Performed by: SURGERY

## 2018-11-26 RX ORDER — SCOLOPAMINE TRANSDERMAL SYSTEM 1 MG/1
1.5 PATCH, EXTENDED RELEASE TRANSDERMAL
COMMUNITY
Start: 2018-08-21 | End: 2019-08-22

## 2018-11-26 RX ORDER — ONDANSETRON 4 MG/1
TABLET, FILM COATED ORAL AS NEEDED
Refills: 1 | COMMUNITY
Start: 2018-08-21 | End: 2021-03-27

## 2018-11-26 NOTE — PROGRESS NOTES
Chief Complaint   Patient presents with   • Follow-up     Routine check on breast and mammogram.        HPI  6 month follow up abnormal mammogram. 2 aunts with breast cancer. No hx of personal breast cancer. No newly palpable masses, skin dimpling, nipple D/c axillary adenopathy.      Study Result        PROCEDURE: Bilateral diagnostic mammogram with 3-D tomosynthesis  with CAD     REASON FOR EXAM: Follow-up of suspected benign left breast  postbiopsy changes.     FINDINGS: Comparison study dated 5/1/2018, 10/27/2017, 8/23/2017,  a 20/5/2016, and 2/16/2016.Marked improvement in left breast  upper outer quadrant postbiopsy changes which have completely  resolved in the interim. Clip sosa site of biopsy. No suspicious  mass or malignant type microcalcifications . No skin thickening  or retraction. .     Parenchymal pattern: Almost complete fatty involutional change of  breast     IMPRESSION:  No mammographic evidence of malignancy.     BI-RADS category 2: Benign findings.     Recommendation: Annual mammography      Electronically signed by:  Constantino Renteria MD  11/16/2018 1:45 PM CST  Workstation: [id number]   ( I have personally reviewed the breast imaging and concur with the findings of the radiologist- BiRADS 2)    Past Medical History:   Diagnosis Date   • Acute bronchitis    • Acute maxillary sinusitis    • Acute pharyngitis    • Allergic rhinitis    • Astigmatism    • Derangement of meniscus 9/19/2009   • Food allergy     spinach   • Hypermetropia    • Loose body of knee 9/19/2009   • Need for immunization against influenza    • Osteoporosis 7/10/2014   • Presbyopia    • Vulvovaginitis        Past Surgical History:   Procedure Laterality Date   • BREAST BIOPSY     • LAPAROSCOPIC CHOLECYSTECTOMY     • WRIST SURGERY           Current Outpatient Medications:   •  Ascorbic Acid (DOMINICK-C PO), Take  by mouth., Disp: , Rfl:   •  Cholecalciferol (VITAMIN D3 PO), Take 1 tablet by mouth. vit D3 & K-berberine-hops  , Disp: , Rfl:    •  denosumab (PROLIA) 60 MG/ML solution syringe, Inject  under the skin into the appropriate area as directed 1 (One) Time., Disp: , Rfl:   •  denosumab (PROLIA) 60 MG/ML solution syringe, Inject 1 mL under the skin into the appropriate area as directed Every 6 (Six) Months., Disp: , Rfl:   •  diphenhydrAMINE (BENADRYL) 25 mg capsule, Take 25 mg by mouth Every Night., Disp: , Rfl:   •  Misc Natural Products (OSTEO BI-FLEX ADV DOUBLE ST PO), Take  by mouth., Disp: , Rfl:   •  Multiple Vitamins-Minerals (MULTIVITAMIN ADULT PO), Take 1 tablet by mouth Daily., Disp: , Rfl:   •  Multiple Vitamins-Minerals (ZINC PO), Take  by mouth., Disp: , Rfl:   •  Scopolamine (TRANSDERM-SCOP, 1.5 MG,) 1.5 MG/3DAYS patch, Place 1.5 mg on the skin as directed by provider., Disp: , Rfl:   •  atorvastatin (LIPITOR) 20 MG tablet, , Disp: , Rfl:   •  chlorpheniramine (CHLOR-TRIMETON) 4 MG tablet, Take 4 mg by mouth Daily., Disp: , Rfl:   •  fluconazole (DIFLUCAN) 150 MG tablet, Take 1 tablet by mouth As Needed., Disp: , Rfl: 5  •  Glucosamine-Chondroitin (GLUCOSAMINE CHONDR COMPLEX PO), Take  by mouth., Disp: , Rfl:   •  ondansetron (ZOFRAN) 4 MG tablet, As Needed., Disp: , Rfl: 1    No Known Allergies    Family History   Problem Relation Age of Onset   • Breast cancer Maternal Aunt        Social History     Socioeconomic History   • Marital status:      Spouse name: Not on file   • Number of children: Not on file   • Years of education: Not on file   • Highest education level: Not on file   Social Needs   • Financial resource strain: Not on file   • Food insecurity - worry: Not on file   • Food insecurity - inability: Not on file   • Transportation needs - medical: Not on file   • Transportation needs - non-medical: Not on file   Occupational History   • Not on file   Tobacco Use   • Smoking status: Former Smoker   • Smokeless tobacco: Never Used   Substance and Sexual Activity   • Alcohol use: Yes     Comment: occassionally   •  Drug use: No   • Sexual activity: Defer   Other Topics Concern   • Not on file   Social History Narrative   • Not on file       Review of Systems   Constitutional: Negative for appetite change, chills, fever and unexpected weight change.   HENT: Negative for hearing loss, nosebleeds and trouble swallowing.    Eyes: Negative for visual disturbance.   Respiratory: Negative for apnea, cough, choking, chest tightness, shortness of breath, wheezing and stridor.    Cardiovascular: Negative for chest pain, palpitations and leg swelling.   Gastrointestinal: Negative for abdominal distention, abdominal pain, blood in stool, constipation, diarrhea, nausea and vomiting.   Endocrine: Negative for cold intolerance, heat intolerance, polydipsia, polyphagia and polyuria.   Genitourinary: Negative for difficulty urinating, dysuria, frequency, hematuria and urgency.   Musculoskeletal: Negative for arthralgias, back pain, myalgias and neck pain.   Skin: Negative for color change, pallor and rash.   Allergic/Immunologic: Negative for immunocompromised state.   Neurological: Negative for dizziness, seizures, syncope, light-headedness, numbness and headaches.   Hematological: Negative for adenopathy.   Psychiatric/Behavioral: Negative for suicidal ideas. The patient is not nervous/anxious.        Physical Exam   Constitutional: She is oriented to person, place, and time. She appears well-developed and well-nourished. No distress.   HENT:   Head: Normocephalic and atraumatic.   Mouth/Throat: No oropharyngeal exudate.   Eyes: EOM are normal. Pupils are equal, round, and reactive to light. No scleral icterus.   Neck: Normal range of motion. Neck supple. No JVD present. No tracheal deviation present. No thyromegaly present.   Cardiovascular: Normal rate, regular rhythm and normal heart sounds.   Pulmonary/Chest: Effort normal and breath sounds normal. No stridor. No respiratory distress. She has no wheezes. She has no rales. She exhibits no  tenderness. Right breast exhibits no inverted nipple, no mass, no nipple discharge, no skin change and no tenderness. Left breast exhibits no inverted nipple, no mass, no nipple discharge, no skin change and no tenderness. Breasts are symmetrical. There is no breast swelling.   Genitourinary: No breast tenderness, discharge or bleeding.   Musculoskeletal: Normal range of motion. She exhibits no edema, tenderness or deformity.   Lymphadenopathy:     She has no cervical adenopathy.     She has no axillary adenopathy.   Neurological: She is alert and oriented to person, place, and time.   Skin: Skin is warm and dry. No rash noted. She is not diaphoretic. No erythema. No pallor.   Psychiatric: She has a normal mood and affect. Her behavior is normal. Judgment normal.   Vitals reviewed.        ASSESSMENT    Ayleen was seen today for follow-up.    Diagnoses and all orders for this visit:    Abnormal mammogram of left breast        PLAN    1. Recheck as needed  2. Yearly mammograms and exam because of risk factors              This document has been electronically signed by Sabas Silvestre MD on November 26, 2018 10:19 AM

## 2019-01-30 ENCOUNTER — TELEPHONE (OUTPATIENT)
Dept: ONCOLOGY | Facility: HOSPITAL | Age: 69
End: 2019-01-30

## 2019-01-30 NOTE — TELEPHONE ENCOUNTER
Called patient, explained that she is due for Prolia.  We have identified that we need an updated order for Prolia injection and forwarded her plan to Robert to update.  Patient states that she will be out of town for the next month.  Explained that we will call her to reschedule her Prolia once we receive an updated order and authorization has been established.  Verbal understanding by patient.  Claire Aviles RN  January 30, 2019  3:54 PM

## 2019-01-31 ENCOUNTER — APPOINTMENT (OUTPATIENT)
Dept: ONCOLOGY | Facility: HOSPITAL | Age: 69
End: 2019-01-31

## 2019-03-18 ENCOUNTER — OFFICE VISIT (OUTPATIENT)
Dept: ORTHOPEDIC SURGERY | Facility: CLINIC | Age: 69
End: 2019-03-18

## 2019-03-18 VITALS — HEIGHT: 70 IN | WEIGHT: 199 LBS | BODY MASS INDEX: 28.49 KG/M2

## 2019-03-18 DIAGNOSIS — M81.0 AGE-RELATED OSTEOPOROSIS WITHOUT CURRENT PATHOLOGICAL FRACTURE: Primary | ICD-10-CM

## 2019-03-18 PROCEDURE — 99213 OFFICE O/P EST LOW 20 MIN: CPT | Performed by: NURSE PRACTITIONER

## 2019-03-18 NOTE — PROGRESS NOTES
Ayleen Ruiz is a 69 y.o. female   Primary provider:  Tracey Calles MD       Chief Complaint   Patient presents with   • Osteoporosis       HISTORY OF PRESENT ILLNESS: [facility] evaluation and treatment of her osteoporosis.  Patient was diagnosed with osteoporosis several years ago.  Patient was treated previously with Fosamax.  She then completed 2 years of Forteo therapy.  She has currently been receiving Prolia injections for the past 3 years. Her last Prolia injection was given on 7/31/2018.  Patient states that she tolerates Prolia well and has no complaints or concerns today regarding this medication.  Patient sustained previous fractures to her cervical spine and distal radius due to a fall in 2010.  No other fractures are reported since that time.  Patient denies any decrease in her height.  No family history of osteoporosis is reported.  Patient has no comorbid medical conditions that increase her risk for osteoporosis.  No high risk medications are identified today that increase her risk for osteoporosis.  No history of chronic steroid use.  Patient is a former smoker who quit smoking several years ago.  Patient takes calcium and vitamin D supplements daily.  Patient is a postmenopausal  female.    Osteoporosis   This is a chronic problem. Associated symptoms include arthralgias. Treatments tried: Fosamax, Forteo, Prolia.        CONCURRENT MEDICAL HISTORY:    Past Medical History:   Diagnosis Date   • Acute bronchitis    • Acute maxillary sinusitis    • Acute pharyngitis    • Allergic rhinitis    • Astigmatism    • Derangement of meniscus 9/19/2009   • Food allergy     spinach   • Hypermetropia    • Loose body of knee 9/19/2009   • Need for immunization against influenza    • Osteoporosis 7/10/2014   • Presbyopia    • Vulvovaginitis        No Known Allergies      Current Outpatient Medications:   •  Ascorbic Acid (DOMINICK-C PO), Take  by  mouth., Disp: , Rfl:   •  atorvastatin (LIPITOR) 20 MG tablet, , Disp: , Rfl:   •  chlorpheniramine (CHLOR-TRIMETON) 4 MG tablet, Take 4 mg by mouth Daily., Disp: , Rfl:   •  Cholecalciferol (VITAMIN D3 PO), Take 1 tablet by mouth. vit D3 & K-berberine-hops  , Disp: , Rfl:   •  denosumab (PROLIA) 60 MG/ML solution syringe, Inject  under the skin into the appropriate area as directed 1 (One) Time., Disp: , Rfl:   •  denosumab (PROLIA) 60 MG/ML solution syringe, Inject 1 mL under the skin into the appropriate area as directed Every 6 (Six) Months., Disp: , Rfl:   •  diphenhydrAMINE (BENADRYL) 25 mg capsule, Take 25 mg by mouth Every Night., Disp: , Rfl:   •  fluconazole (DIFLUCAN) 150 MG tablet, Take 1 tablet by mouth As Needed., Disp: , Rfl: 5  •  Glucosamine-Chondroitin (GLUCOSAMINE CHONDR COMPLEX PO), Take  by mouth., Disp: , Rfl:   •  Misc Natural Products (OSTEO BI-FLEX ADV DOUBLE ST PO), Take  by mouth., Disp: , Rfl:   •  Multiple Vitamins-Minerals (MULTIVITAMIN ADULT PO), Take 1 tablet by mouth Daily., Disp: , Rfl:   •  Multiple Vitamins-Minerals (ZINC PO), Take  by mouth., Disp: , Rfl:   •  ondansetron (ZOFRAN) 4 MG tablet, As Needed., Disp: , Rfl: 1  •  Scopolamine (TRANSDERM-SCOP, 1.5 MG,) 1.5 MG/3DAYS patch, Place 1.5 mg on the skin as directed by provider., Disp: , Rfl:   •  denosumab (PROLIA) 60 MG/ML solution syringe, Inject 1 mL under the skin into the appropriate area as directed 1 (One) Time for 1 dose., Disp: 1.8 mL, Rfl: 1    Past Surgical History:   Procedure Laterality Date   • BREAST BIOPSY     • LAPAROSCOPIC CHOLECYSTECTOMY     • WRIST SURGERY         Family History   Problem Relation Age of Onset   • Breast cancer Maternal Aunt        Social History     Socioeconomic History   • Marital status:      Spouse name: Not on file   • Number of children: Not on file   • Years of education: Not on file   • Highest education level: Not on file   Tobacco Use   • Smoking status: Former Smoker   •  "Smokeless tobacco: Never Used   Substance and Sexual Activity   • Alcohol use: Yes     Comment: occassionally   • Drug use: No   • Sexual activity: Defer        Review of Systems   Constitutional: Negative.    HENT: Negative.    Eyes: Negative.    Respiratory: Negative.    Cardiovascular: Negative.    Gastrointestinal: Negative.    Endocrine: Negative.    Genitourinary: Negative.    Musculoskeletal: Positive for arthralgias.   Skin: Negative.    Allergic/Immunologic: Negative.    Neurological: Negative.    Hematological: Negative.    Psychiatric/Behavioral: Negative.        PHYSICAL EXAMINATION:       Ht 177.8 cm (70\")   Wt 90.3 kg (199 lb)   BMI 28.55 kg/m²     Physical Exam   Constitutional: She is oriented to person, place, and time. Vital signs are normal. She appears well-developed and well-nourished. She is active and cooperative. She does not appear ill. No distress.   HENT:   Head: Normocephalic.   Pulmonary/Chest: Effort normal. No respiratory distress.   Abdominal: Soft. She exhibits no distension.   Musculoskeletal: She exhibits no edema, tenderness or deformity.   Neurological: She is alert and oriented to person, place, and time. GCS eye subscore is 4. GCS verbal subscore is 5. GCS motor subscore is 6.   Skin: Skin is warm, dry and intact. Capillary refill takes less than 2 seconds. No erythema.   Psychiatric: She has a normal mood and affect. Her speech is normal and behavior is normal. Judgment and thought content normal. Cognition and memory are normal.   Vitals reviewed.      GAIT:     [x]  Normal  []  Antalgic    Assistive device: [x]  None  []  Walker     []  Crutches  []  Cane     []  Wheelchair  []  Stretcher    Back Exam     Tenderness   The patient is experiencing no tenderness.     Range of Motion   The patient has normal back ROM.    Muscle Strength   The patient has normal back strength.    Tests   Straight leg raise right: negative  Straight leg raise left: negative    Reflexes "   Patellar: normal    Other   Sensation: normal  Gait: normal   Erythema: no back redness    Comments:  No pain or limitations with range of motion.  No kyphosis.  No spinal tenderness.  No focal neurological deficits.            DEXA scan     HISTORY: Osteoporosis screening postmenopausal     Scans were obtained at the L1-L4 levels and of each hip.     Average bone mineral density for the lumbar spine is 0.869 g/sq  cm. The T score of -1.6 corresponds to a WHO of classification of  osteopenia.      The bone mineral density for the left femoral neck is 0.568 g/sq  cm. The T score of -2.5 corresponds to a WHO classification of  osteoporosis. Total left hip T score of -1.8 corresponds to  osteopenia.     The bone mineral density for the right femoral neck is 0.640 g/sq  cm. The T score of -1.9 corresponds to a WHO classification of  osteopenia. Total right hip T score of -1.8 also corresponds to  osteopenia.     IMPRESSION:  CONCLUSION:  Lumbar spine and right hip osteopenia. Fracture risk increased.  Left femoral neck osteoporosis. Fracture risk high.     Electronically signed by:  Don Perez MD  7/26/2018 10:25 AM  CDT Workstation: [id number]        ASSESSMENT:    Diagnoses and all orders for this visit:    Age-related osteoporosis without current pathological fracture    Other orders  -     denosumab (PROLIA) 60 MG/ML solution syringe; Inject 1 mL under the skin into the appropriate area as directed 1 (One) Time for 1 dose.    PLAN    Bone density study results from 7/26/2018 are reviewed and discussed with patient today.  Patient has been treated with Prolia for the past 3 years and is tolerating the medication well.  She wants to continue with this treatment therapy for her osteoporosis.  Prior to this, patient took Fosamax and also completed 2 years of Forteo treatment.  She needs a new order initiated for the Prolia and she is due for an injection soon as it is approved and scheduled.  We will administer  her next Prolia injection in office since we have established the [facility] clinic to manage osteoporosis patients.  Patient will need some new labs prior to administration of her Prolia injection to check her Calcium levels.  We discussed her individualized risk factors for osteoporosis. We discussed the importance of proper nutrition and adequate dietary intake of calcium and vitamin D for optimal bone health and prevention of worsening osteoporosis.  Recommend to continue her calcium plus vitamin D supplements daily.  We discussed the importance of consistent weightbearing exercise, such as walking, for optimal bone health and prevention of worsening osteoporosis.  Patient is a non-smoker so smoking cessation is not addressed or applicable.  Follow-up for administration of Prolia injection once it is approved by insurance.    Return for follow up for Prolia injection.        This document has been electronically signed by ANDREA Keenan on March 21, 2019 9:20 AM      ANDREA Keenan

## 2019-04-03 ENCOUNTER — INFUSION (OUTPATIENT)
Dept: ONCOLOGY | Facility: HOSPITAL | Age: 69
End: 2019-04-03

## 2019-04-03 DIAGNOSIS — M81.0 AGE-RELATED OSTEOPOROSIS WITHOUT CURRENT PATHOLOGICAL FRACTURE: Primary | ICD-10-CM

## 2019-04-03 LAB
CALCIUM SPEC-SCNC: 10.7 MG/DL (ref 8.6–10.5)
MAGNESIUM SERPL-MCNC: 2.6 MG/DL (ref 1.6–2.4)
PHOSPHATE SERPL-MCNC: 4 MG/DL (ref 2.5–4.5)

## 2019-04-03 PROCEDURE — 25010000002 DENOSUMAB 60 MG/ML SOLUTION: Performed by: NURSE PRACTITIONER

## 2019-04-03 PROCEDURE — 83735 ASSAY OF MAGNESIUM: CPT | Performed by: NURSE PRACTITIONER

## 2019-04-03 PROCEDURE — 84100 ASSAY OF PHOSPHORUS: CPT | Performed by: NURSE PRACTITIONER

## 2019-04-03 PROCEDURE — 82310 ASSAY OF CALCIUM: CPT | Performed by: NURSE PRACTITIONER

## 2019-04-03 PROCEDURE — 96372 THER/PROPH/DIAG INJ SC/IM: CPT | Performed by: NURSE PRACTITIONER

## 2019-04-03 PROCEDURE — 36415 COLL VENOUS BLD VENIPUNCTURE: CPT | Performed by: NURSE PRACTITIONER

## 2019-04-03 RX ADMIN — DENOSUMAB 60 MG: 60 INJECTION SUBCUTANEOUS at 09:51

## 2019-04-05 ENCOUNTER — APPOINTMENT (OUTPATIENT)
Dept: ONCOLOGY | Facility: HOSPITAL | Age: 69
End: 2019-04-05

## 2019-06-13 ENCOUNTER — TELEPHONE (OUTPATIENT)
Dept: ORTHOPEDIC SURGERY | Facility: CLINIC | Age: 69
End: 2019-06-13

## 2019-06-17 DIAGNOSIS — M81.0 AGE-RELATED OSTEOPOROSIS WITHOUT CURRENT PATHOLOGICAL FRACTURE: Primary | ICD-10-CM

## 2019-06-26 DIAGNOSIS — Z12.31 SCREENING MAMMOGRAM, ENCOUNTER FOR: Primary | ICD-10-CM

## 2019-10-03 ENCOUNTER — INFUSION (OUTPATIENT)
Dept: ONCOLOGY | Facility: HOSPITAL | Age: 69
End: 2019-10-03

## 2019-10-03 VITALS
RESPIRATION RATE: 18 BRPM | TEMPERATURE: 97.5 F | DIASTOLIC BLOOD PRESSURE: 64 MMHG | SYSTOLIC BLOOD PRESSURE: 128 MMHG | HEART RATE: 82 BPM

## 2019-10-03 DIAGNOSIS — M81.0 AGE-RELATED OSTEOPOROSIS WITHOUT CURRENT PATHOLOGICAL FRACTURE: ICD-10-CM

## 2019-10-03 DIAGNOSIS — M81.0 OSTEOPOROSIS, UNSPECIFIED OSTEOPOROSIS TYPE, UNSPECIFIED PATHOLOGICAL FRACTURE PRESENCE: Primary | ICD-10-CM

## 2019-10-03 LAB
CALCIUM SPEC-SCNC: 10.6 MG/DL (ref 8.6–10.5)
MAGNESIUM SERPL-MCNC: 2.5 MG/DL (ref 1.6–2.4)
PHOSPHATE SERPL-MCNC: 3.5 MG/DL (ref 2.5–4.5)

## 2019-10-03 PROCEDURE — 36415 COLL VENOUS BLD VENIPUNCTURE: CPT | Performed by: NURSE PRACTITIONER

## 2019-10-03 PROCEDURE — 84100 ASSAY OF PHOSPHORUS: CPT | Performed by: NURSE PRACTITIONER

## 2019-10-03 PROCEDURE — 25010000002 DENOSUMAB 60 MG/ML SOLUTION PREFILLED SYRINGE: Performed by: NURSE PRACTITIONER

## 2019-10-03 PROCEDURE — 96372 THER/PROPH/DIAG INJ SC/IM: CPT | Performed by: NURSE PRACTITIONER

## 2019-10-03 PROCEDURE — 82310 ASSAY OF CALCIUM: CPT | Performed by: NURSE PRACTITIONER

## 2019-10-03 PROCEDURE — 83735 ASSAY OF MAGNESIUM: CPT | Performed by: NURSE PRACTITIONER

## 2019-10-03 RX ADMIN — DENOSUMAB 60 MG: 60 INJECTION SUBCUTANEOUS at 10:14

## 2019-12-09 ENCOUNTER — OFFICE VISIT (OUTPATIENT)
Dept: SURGERY | Facility: CLINIC | Age: 69
End: 2019-12-09

## 2019-12-09 VITALS
TEMPERATURE: 98 F | HEIGHT: 70 IN | BODY MASS INDEX: 28.4 KG/M2 | WEIGHT: 198.4 LBS | DIASTOLIC BLOOD PRESSURE: 80 MMHG | SYSTOLIC BLOOD PRESSURE: 140 MMHG | HEART RATE: 78 BPM

## 2019-12-09 DIAGNOSIS — Z80.3 FAMILY HX-BREAST MALIGNANCY: ICD-10-CM

## 2019-12-09 DIAGNOSIS — Z87.898 HX OF ABNORMAL MAMMOGRAM: Primary | ICD-10-CM

## 2019-12-09 PROCEDURE — 99212 OFFICE O/P EST SF 10 MIN: CPT | Performed by: SURGERY

## 2019-12-09 NOTE — PROGRESS NOTES
Chief Complaint: This is a 69 y.o. woman seen in consultation for family history breast cancer and routine followup benign breast disease    History of Present Illness:  Patient has noted no new masses, skin changes, nipple discharge, nipple changes prior to her most recent imaging.  Her most recent imaging includes the following:   Study Result   PROCEDURE: Bilateral digital screening mammogram   HISTORY: Routine screening mammography.   COMPARISON: Previous exams dated 2018 through 2016   NOTE: Computer-aided detection was utilized during this exam.   Digital breast tomosynthesis was performed.   FINDINGS:   CC and MLO views are obtained of both breasts.   Parenchymal pattern: There are scattered areas of fibroglandular   density.   No suspicious mass, architectural distortion or suspicious   microcalcifications. Postprocedural changes are noted in the   left breast.   IMPRESSION:   CONCLUSION:   No mammographic evidence of malignancy. In the absence of   suspicious clinical or physical findings, routine follow-up   mammography is recommended in one year.   BIRADS Category 2: Benign findings   Electronically signed by: Fernando Patino MD 12/3/2019 8:35 AM CST   Workstation: [id number]        ( I have personally reviewed the breast imaging and concur with the findings of the radiologist- BiRADS 2)    Breast Biopsy History: Mammotome left side several years agoBreast Cancer History: Patient does not have a past medical history of breast cancer.  Her family history includes the followin aunts with breast cancer    She is here for evaluation.    Past Medical History:   Diagnosis Date   • Acute bronchitis    • Acute maxillary sinusitis    • Acute pharyngitis    • Allergic rhinitis    • Astigmatism    • Derangement of meniscus 2009   • Food allergy     spinach   • Hypermetropia    • Loose body of knee 2009   • Need for immunization against influenza    • Osteoporosis 7/10/2014   • Presbyopia    •  Vulvovaginitis      Past Surgical History:   Procedure Laterality Date   • BREAST BIOPSY     • LAPAROSCOPIC CHOLECYSTECTOMY     • WRIST SURGERY       Prior to Admission medications    Medication Sig Start Date End Date Taking? Authorizing Provider   Ascorbic Acid (DOMINICK-C PO) Take  by mouth.   Yes Richie Barney MD   atorvastatin (LIPITOR) 20 MG tablet  3/2/18  Yes Richie Barney MD   chlorpheniramine (CHLOR-TRIMETON) 4 MG tablet Take 4 mg by mouth Daily.   Yes Richie Barney MD   Cholecalciferol (VITAMIN D3 PO) Take 1 tablet by mouth. vit D3 & K-berberine-hops     Yes Richie Barney MD   denosumab (PROLIA) 60 MG/ML solution syringe Inject  under the skin into the appropriate area as directed 1 (One) Time.   Yes Richie Barney MD   denosumab (PROLIA) 60 MG/ML solution syringe Inject 1 mL under the skin into the appropriate area as directed Every 6 (Six) Months.   Yes Richie Barney MD   diphenhydrAMINE (BENADRYL) 25 mg capsule Take 25 mg by mouth Every Night.   Yes Richie Barney MD   fluconazole (DIFLUCAN) 150 MG tablet Take 1 tablet by mouth As Needed. 8/15/17  Yes Richie Barney MD   Glucosamine-Chondroitin (GLUCOSAMINE CHONDR COMPLEX PO) Take  by mouth.   Yes Richie Barney MD   Misc Natural Products (OSTEO BI-FLEX ADV DOUBLE ST PO) Take  by mouth.   Yes Richie Barney MD   Multiple Vitamins-Minerals (MULTIVITAMIN ADULT PO) Take 1 tablet by mouth Daily.   Yes Richie Barney MD   Multiple Vitamins-Minerals (ZINC PO) Take  by mouth.   Yes Richie Barney MD   ondansetron (ZOFRAN) 4 MG tablet As Needed. 8/21/18  Yes Richie Barney MD     Not on File  Family History   Problem Relation Age of Onset   • Breast cancer Maternal Aunt      Social History     Socioeconomic History   • Marital status:      Spouse name: Not on file   • Number of children: Not on file   • Years of education: Not on file   • Highest education  level: Not on file   Tobacco Use   • Smoking status: Former Smoker   • Smokeless tobacco: Never Used   Substance and Sexual Activity   • Alcohol use: Yes     Comment: occassionally   • Drug use: No   • Sexual activity: Defer     Review of Systems   Constitutional: Negative for appetite change, chills, fever and unexpected weight change.   HENT: Negative for hearing loss, nosebleeds and trouble swallowing.    Eyes: Negative for visual disturbance.   Respiratory: Negative for apnea, cough, choking, chest tightness, shortness of breath, wheezing and stridor.    Cardiovascular: Negative for chest pain, palpitations and leg swelling.   Gastrointestinal: Negative for abdominal distention, abdominal pain, blood in stool, constipation, diarrhea, nausea and vomiting.   Endocrine: Negative for cold intolerance, heat intolerance, polydipsia, polyphagia and polyuria.   Genitourinary: Negative for difficulty urinating, dysuria, frequency, hematuria and urgency.   Musculoskeletal: Positive for arthralgias (right knee). Negative for back pain, myalgias and neck pain.   Skin: Negative for color change, pallor and rash.   Allergic/Immunologic: Negative for immunocompromised state.   Neurological: Negative for dizziness, seizures, syncope, light-headedness, numbness and headaches.   Hematological: Negative for adenopathy.   Psychiatric/Behavioral: Negative for suicidal ideas. The patient is not nervous/anxious.      Physical Exam   Constitutional: She is oriented to person, place, and time. She appears well-developed and well-nourished. No distress.   HENT:   Head: Normocephalic and atraumatic.   Mouth/Throat: No oropharyngeal exudate.   Eyes: Pupils are equal, round, and reactive to light. EOM are normal. No scleral icterus.   Neck: Normal range of motion. Neck supple. No JVD present. No tracheal deviation present. No thyromegaly present.   Cardiovascular: Normal rate, regular rhythm and normal heart sounds.   Pulmonary/Chest:  Effort normal and breath sounds normal. No stridor. No respiratory distress. She has no wheezes. She has no rales. She exhibits no tenderness. Right breast exhibits no inverted nipple, no mass, no nipple discharge, no skin change and no tenderness. Left breast exhibits no inverted nipple, no mass, no nipple discharge, no skin change and no tenderness. No breast swelling, tenderness, discharge or bleeding. Breasts are symmetrical.   Genitourinary: No breast swelling, tenderness, discharge or bleeding.   Musculoskeletal: Normal range of motion. She exhibits no edema, tenderness or deformity.   Lymphadenopathy:     She has no cervical adenopathy.     She has no axillary adenopathy.   Neurological: She is alert and oriented to person, place, and time.   Skin: Skin is warm and dry. No rash noted. She is not diaphoretic. No erythema. No pallor.   Psychiatric: She has a normal mood and affect. Her behavior is normal. Judgment normal.   Vitals reviewed.    Vitals:    12/09/19 1035   BP: 140/80   Pulse: 78   Temp: 98 °F (36.7 °C)     Assessment:    Ayleen was seen today for follow-up.    Diagnoses and all orders for this visit:    Hx of abnormal mammogram    Family hx-breast malignancy        Plan:  1. Yearly recheck with yearly mammograms and exam                This document has been electronically signed by Sabas Silvestre MD on December 9, 2019 10:52 AM

## 2019-12-09 NOTE — PATIENT INSTRUCTIONS

## 2019-12-19 ENCOUNTER — ANESTHESIA EVENT (OUTPATIENT)
Dept: GASTROENTEROLOGY | Facility: HOSPITAL | Age: 69
End: 2019-12-19

## 2019-12-19 ENCOUNTER — HOSPITAL ENCOUNTER (OUTPATIENT)
Facility: HOSPITAL | Age: 69
Setting detail: HOSPITAL OUTPATIENT SURGERY
Discharge: HOME | End: 2019-12-19
Attending: INTERNAL MEDICINE | Admitting: INTERNAL MEDICINE

## 2019-12-19 ENCOUNTER — ANESTHESIA (OUTPATIENT)
Dept: GASTROENTEROLOGY | Facility: HOSPITAL | Age: 69
End: 2019-12-19

## 2019-12-19 VITALS
HEIGHT: 70 IN | OXYGEN SATURATION: 97 % | SYSTOLIC BLOOD PRESSURE: 102 MMHG | RESPIRATION RATE: 18 BRPM | BODY MASS INDEX: 26.92 KG/M2 | TEMPERATURE: 97.5 F | DIASTOLIC BLOOD PRESSURE: 59 MMHG | HEART RATE: 90 BPM | WEIGHT: 188 LBS

## 2019-12-19 DIAGNOSIS — Z12.11 SCREEN FOR COLON CANCER: ICD-10-CM

## 2019-12-19 PROCEDURE — 88305 TISSUE EXAM BY PATHOLOGIST: CPT | Performed by: INTERNAL MEDICINE

## 2019-12-19 PROCEDURE — 88305 TISSUE EXAM BY PATHOLOGIST: CPT | Performed by: PATHOLOGY

## 2019-12-19 PROCEDURE — 25010000002 PROPOFOL 10 MG/ML EMULSION: Performed by: NURSE ANESTHETIST, CERTIFIED REGISTERED

## 2019-12-19 RX ORDER — PROPOFOL 10 MG/ML
VIAL (ML) INTRAVENOUS AS NEEDED
Status: DISCONTINUED | OUTPATIENT
Start: 2019-12-19 | End: 2019-12-19 | Stop reason: SURG

## 2019-12-19 RX ORDER — LIDOCAINE HYDROCHLORIDE 20 MG/ML
INJECTION, SOLUTION EPIDURAL; INFILTRATION; INTRACAUDAL; PERINEURAL AS NEEDED
Status: DISCONTINUED | OUTPATIENT
Start: 2019-12-19 | End: 2019-12-19 | Stop reason: SURG

## 2019-12-19 RX ORDER — DEXTROSE AND SODIUM CHLORIDE 5; .45 G/100ML; G/100ML
30 INJECTION, SOLUTION INTRAVENOUS CONTINUOUS PRN
Status: DISCONTINUED | OUTPATIENT
Start: 2019-12-19 | End: 2019-12-19 | Stop reason: HOSPADM

## 2019-12-19 RX ADMIN — PROPOFOL 20 MG: 10 INJECTION, EMULSION INTRAVENOUS at 09:36

## 2019-12-19 RX ADMIN — PROPOFOL 20 MG: 10 INJECTION, EMULSION INTRAVENOUS at 09:28

## 2019-12-19 RX ADMIN — PROPOFOL 30 MG: 10 INJECTION, EMULSION INTRAVENOUS at 09:38

## 2019-12-19 RX ADMIN — PROPOFOL 30 MG: 10 INJECTION, EMULSION INTRAVENOUS at 09:34

## 2019-12-19 RX ADMIN — PROPOFOL 90 MG: 10 INJECTION, EMULSION INTRAVENOUS at 09:26

## 2019-12-19 RX ADMIN — LIDOCAINE HYDROCHLORIDE 50 MG: 20 INJECTION, SOLUTION EPIDURAL; INFILTRATION; INTRACAUDAL; PERINEURAL at 09:26

## 2019-12-19 RX ADMIN — PROPOFOL 20 MG: 10 INJECTION, EMULSION INTRAVENOUS at 09:32

## 2019-12-19 RX ADMIN — PROPOFOL 20 MG: 10 INJECTION, EMULSION INTRAVENOUS at 09:30

## 2019-12-19 RX ADMIN — DEXTROSE AND SODIUM CHLORIDE 30 ML/HR: 5; 450 INJECTION, SOLUTION INTRAVENOUS at 08:27

## 2019-12-19 NOTE — ANESTHESIA PREPROCEDURE EVALUATION
Anesthesia Evaluation     Patient summary reviewed and Nursing notes reviewed   NPO Solid Status: > 8 hours  NPO Liquid Status: > 8 hours           Airway   Mallampati: II  TM distance: >3 FB  Neck ROM: full  No difficulty expected  Dental      Pulmonary - normal exam   Cardiovascular - normal exam    (+) hyperlipidemia,       Neuro/Psych  GI/Hepatic/Renal/Endo      Musculoskeletal     Abdominal  - normal exam   Substance History      OB/GYN          Other                        Anesthesia Plan    ASA 2     MAC     intravenous induction     Anesthetic plan, all risks, benefits, and alternatives have been provided, discussed and informed consent has been obtained with: patient.    Plan discussed with CRNA.

## 2019-12-19 NOTE — ANESTHESIA POSTPROCEDURE EVALUATION
Patient: Ayleen Ruiz    Procedure Summary     Date:  12/19/19 Room / Location:  [facility] ENDOSCOPY 2 / [facility] ENDOSCOPY    Anesthesia Start:  0922 Anesthesia Stop:  0940    Procedure:  COLONOSCOPY (N/A ) Diagnosis:       Screen for colon cancer      (Screen for colon cancer [Z12.11])    Surgeon:  Braulio Bowen DO Provider:  Ramses Iniguez CRNA    Anesthesia Type:  MAC ASA Status:  2          Anesthesia Type: MAC    Vitals  No vitals data found for the desired time range.          Post Anesthesia Care and Evaluation    Patient location during evaluation: bedside  Patient participation: waiting for patient participation  Level of consciousness: responsive to verbal stimuli  Pain score: 0  Pain management: adequate  Airway patency: patent  Anesthetic complications: No anesthetic complications  PONV Status: none  Cardiovascular status: acceptable  Respiratory status: acceptable  Hydration status: acceptable

## 2019-12-19 NOTE — H&P
Braulio Cuba DO,[facility]  Gastroenterology  Hepatology  Endoscopy  Board Certified in Internal Medicine and gastroenterology  [address], [address]  [place]. [address]  - [phone]   F - [phone]     GASTROENTEROLOGY HISTORY AND PHYSICAL  NOTE   BRAULIO CUBA DO.         SUBJECTIVE:   12/19/2019    Name: Ayleen Ruiz  DOD: 1950      Chief Complaint:     Subjective : Screen for colon cancer    Patient is 69 y.o. female presents with desire for elective colonoscopy.      ROS/HISTORY/ CURRENT MEDICATIONS/OBJECTIVE/VS/PE:   Review of Systems:  All systems unremarkable unless specified below.  Constitutional   HENT  Eyes   Respiratory    Cardiovascular  Gastrointestinal   Endocrine  Genitourinary    Musculoskeletal   Skin  Allergic/Immunologic    Neurological    Hematological  Psychiatric/Behavioral    History:     Past Medical History:   Diagnosis Date   • Acute bronchitis    • Acute maxillary sinusitis    • Acute pharyngitis    • Allergic rhinitis    • Astigmatism    • Derangement of meniscus 9/19/2009   • Food allergy     spinach   • Hypermetropia    • Loose body of knee 9/19/2009   • Need for immunization against influenza    • Osteoporosis 7/10/2014   • Presbyopia    • Vulvovaginitis      Past Surgical History:   Procedure Laterality Date   • BREAST BIOPSY     • LAPAROSCOPIC CHOLECYSTECTOMY     • TONSILLECTOMY     • WRIST SURGERY       Family History   Problem Relation Age of Onset   • Breast cancer Maternal Aunt      Social History     Tobacco Use   • Smoking status: Former Smoker   • Smokeless tobacco: Never Used   Substance Use Topics   • Alcohol use: Yes     Comment: occassionally   • Drug use: No     Prior to Admission medications    Medication Sig Start Date End Date Taking? Authorizing Provider   Ascorbic Acid (DOMINICK-C PO) Take  by mouth.   Yes Provider, MD Richie   chlorpheniramine (CHLOR-TRIMETON) 4 MG tablet Take 4 mg by mouth Daily.   Yes Provider,  MD Richie   Cholecalciferol (VITAMIN D3 PO) Take 1 tablet by mouth. vit D3 & K-berberine-hops     Yes Richie Barney MD   diphenhydrAMINE (BENADRYL) 25 mg capsule Take 25 mg by mouth Every Night.   Yes Richie Barney MD   Misc Natural Products (OSTEO BI-FLEX ADV DOUBLE ST PO) Take  by mouth.   Yes Richie Barney MD   Multiple Vitamins-Minerals (MULTIVITAMIN ADULT PO) Take 1 tablet by mouth Daily.   Yes Richie Barney MD   atorvastatin (LIPITOR) 20 MG tablet Take 20 mg by mouth Every Night. 3/2/18   Richie Barney MD   denosumab (PROLIA) 60 MG/ML solution syringe Inject  under the skin into the appropriate area as directed 1 (One) Time.    Richie Barney MD   denosumab (PROLIA) 60 MG/ML solution syringe Inject 1 mL under the skin into the appropriate area as directed Every 6 (Six) Months.    Richie Barney MD   fluconazole (DIFLUCAN) 150 MG tablet Take 1 tablet by mouth As Needed. 8/15/17   Richie Barney MD   ondansetron (ZOFRAN) 4 MG tablet As Needed. 8/21/18   Richie Barney MD   Glucosamine-Chondroitin (GLUCOSAMINE CHONDR COMPLEX PO) Take  by mouth.  12/19/19  Richie Barney MD   Multiple Vitamins-Minerals (ZINC PO) Take  by mouth.  12/19/19  Richie Barney MD     Allergies:  Patient has no known allergies.    I have reviewed the patients medical history, surgical history and family history in the available medical record system.     Current Medications:     Current Facility-Administered Medications   Medication Dose Route Frequency Provider Last Rate Last Dose   • dextrose 5 % and sodium chloride 0.45 % infusion  30 mL/hr Intravenous Continuous PRN Braulio Bowen DO           Objective     Physical Exam:   Heart Rate:  [96] 96  Resp:  [18] 18  BP: (116)/(67) 116/67    Physical Exam:  General Appearance:    Alert, cooperative, in no acute distress   Head:    Normocephalic, without obvious abnormality, atraumatic   Eyes:             Lids and lashes normal, conjunctivae and sclerae normal, no   icterus, no pallor, corneas clear, PERRLA   Ears:    Ears appear intact with no abnormalities noted   Throat:   No oral lesions, no thrush, oral mucosa moist   Neck:   No adenopathy, supple, trachea midline, no thyromegaly, no     carotid bruit, no JVD   Back:     No kyphosis present, no scoliosis present, no skin lesions,       erythema or scars, no tenderness to percussion or                   palpation,   range of motion normal   Lungs:     Clear to auscultation,respirations regular, even and                   unlabored    Heart:    Regular rhythm and normal rate, normal S1 and S2, no            murmur, no gallop, no rub, no click   Breast Exam:    Deferred   Abdomen:     Normal bowel sounds, no masses, no organomegaly, soft        non-tender, non-distended, no guarding, no rebound                 tenderness   Genitalia:    Deferred   Extremities:   Moves all extremities well, no edema, no cyanosis, no              redness   Pulses:   Pulses palpable and equal bilaterally   Skin:   No bleeding, bruising or rash   Lymph nodes:   No palpable adenopathy   Neurologic:   Cranial nerves 2 - 12 grossly intact, sensation intact, DTR        present and equal bilaterally      Results Review:     No results found for: WBC, HGB, HCT, PLT          No results found for: LIPASE  No results found for: INR  No results found for: CULTURE    Radiology Review:  Imaging Results (Last 72 Hours)     ** No results found for the last 72 hours. **           I reviewed the patient's new clinical results.  I reviewed the patient's new imaging results and agree with the interpretation.     ASSESSMENT/PLAN:   ASSESSMENT:  1.  Screen for colon cancer    PLAN:  1.  Colonoscopy    Risk and benefits associated with the procedure are reviewed with the patient.  The patient wished to proceed     Braulio Bowen DO  12/19/19  8:21 AM

## 2019-12-20 LAB
LAB AP CASE REPORT: NORMAL
PATH REPORT.FINAL DX SPEC: NORMAL
PATH REPORT.GROSS SPEC: NORMAL

## 2019-12-27 DIAGNOSIS — Z12.31 ENCOUNTER FOR SCREENING MAMMOGRAM FOR MALIGNANT NEOPLASM OF BREAST: Primary | ICD-10-CM

## 2020-03-03 ENCOUNTER — TELEPHONE (OUTPATIENT)
Dept: ONCOLOGY | Facility: CLINIC | Age: 70
End: 2020-03-03

## 2020-03-03 NOTE — TELEPHONE ENCOUNTER
Pt will be out of town from 4/3/20-4/21/20. pt is scheduled for a prolia shot on 4/3/20. Pt is calling to see if she can schedule the prolia shot for 4/1/20 or 4/2/20. Please call pt at [phone] with rescheduled appt

## 2020-03-11 PROBLEM — M81.0 SENILE OSTEOPOROSIS: Status: ACTIVE | Noted: 2020-03-11

## 2020-04-23 ENCOUNTER — APPOINTMENT (OUTPATIENT)
Dept: INFUSION THERAPY | Facility: HOSPITAL | Age: 70
End: 2020-04-23

## 2020-04-23 ENCOUNTER — APPOINTMENT (OUTPATIENT)
Dept: ONCOLOGY | Facility: HOSPITAL | Age: 70
End: 2020-04-23

## 2020-05-19 ENCOUNTER — INFUSION (OUTPATIENT)
Dept: ONCOLOGY | Facility: HOSPITAL | Age: 70
End: 2020-05-19

## 2020-05-19 ENCOUNTER — LAB (OUTPATIENT)
Dept: ONCOLOGY | Facility: HOSPITAL | Age: 70
End: 2020-05-19

## 2020-05-19 VITALS
TEMPERATURE: 97.2 F | DIASTOLIC BLOOD PRESSURE: 72 MMHG | RESPIRATION RATE: 16 BRPM | HEART RATE: 94 BPM | SYSTOLIC BLOOD PRESSURE: 138 MMHG

## 2020-05-19 DIAGNOSIS — M81.0 SENILE OSTEOPOROSIS: Primary | ICD-10-CM

## 2020-05-19 LAB
CALCIUM SPEC-SCNC: 10.3 MG/DL (ref 8.6–10.5)
MAGNESIUM SERPL-MCNC: 2.5 MG/DL (ref 1.6–2.4)
PHOSPHATE SERPL-MCNC: 3.6 MG/DL (ref 2.5–4.5)

## 2020-05-19 PROCEDURE — 36415 COLL VENOUS BLD VENIPUNCTURE: CPT | Performed by: NURSE PRACTITIONER

## 2020-05-19 PROCEDURE — 84100 ASSAY OF PHOSPHORUS: CPT | Performed by: NURSE PRACTITIONER

## 2020-05-19 PROCEDURE — 83735 ASSAY OF MAGNESIUM: CPT | Performed by: NURSE PRACTITIONER

## 2020-05-19 PROCEDURE — 82310 ASSAY OF CALCIUM: CPT | Performed by: NURSE PRACTITIONER

## 2020-05-19 PROCEDURE — 96372 THER/PROPH/DIAG INJ SC/IM: CPT | Performed by: NURSE PRACTITIONER

## 2020-05-19 PROCEDURE — 25010000002 DENOSUMAB 60 MG/ML SOLUTION PREFILLED SYRINGE: Performed by: NURSE PRACTITIONER

## 2020-05-19 RX ADMIN — DENOSUMAB 60 MG: 60 INJECTION SUBCUTANEOUS at 10:53

## 2020-05-21 ENCOUNTER — APPOINTMENT (OUTPATIENT)
Dept: ONCOLOGY | Facility: HOSPITAL | Age: 70
End: 2020-05-21

## 2020-09-08 ENCOUNTER — TELEPHONE (OUTPATIENT)
Dept: ORTHOPEDIC SURGERY | Facility: CLINIC | Age: 70
End: 2020-09-08

## 2020-09-08 DIAGNOSIS — M81.0 AGE-RELATED OSTEOPOROSIS WITHOUT CURRENT PATHOLOGICAL FRACTURE: Primary | ICD-10-CM

## 2020-11-17 ENCOUNTER — INFUSION (OUTPATIENT)
Dept: ONCOLOGY | Facility: HOSPITAL | Age: 70
End: 2020-11-17

## 2020-11-17 ENCOUNTER — LAB (OUTPATIENT)
Dept: ONCOLOGY | Facility: HOSPITAL | Age: 70
End: 2020-11-17

## 2020-11-17 VITALS
DIASTOLIC BLOOD PRESSURE: 65 MMHG | TEMPERATURE: 98 F | HEART RATE: 79 BPM | RESPIRATION RATE: 18 BRPM | SYSTOLIC BLOOD PRESSURE: 128 MMHG

## 2020-11-17 DIAGNOSIS — M81.0 AGE-RELATED OSTEOPOROSIS WITHOUT CURRENT PATHOLOGICAL FRACTURE: Primary | ICD-10-CM

## 2020-11-17 DIAGNOSIS — M81.0 SENILE OSTEOPOROSIS: Primary | ICD-10-CM

## 2020-11-17 LAB
CALCIUM SPEC-SCNC: 9.9 MG/DL (ref 8.6–10.5)
MAGNESIUM SERPL-MCNC: 2.6 MG/DL (ref 1.6–2.4)
PHOSPHATE SERPL-MCNC: 3.9 MG/DL (ref 2.5–4.5)

## 2020-11-17 PROCEDURE — 84100 ASSAY OF PHOSPHORUS: CPT

## 2020-11-17 PROCEDURE — 25010000002 DENOSUMAB 60 MG/ML SOLUTION PREFILLED SYRINGE: Performed by: NURSE PRACTITIONER

## 2020-11-17 PROCEDURE — 82310 ASSAY OF CALCIUM: CPT

## 2020-11-17 PROCEDURE — 83735 ASSAY OF MAGNESIUM: CPT

## 2020-11-17 PROCEDURE — 96372 THER/PROPH/DIAG INJ SC/IM: CPT | Performed by: NURSE PRACTITIONER

## 2020-11-17 PROCEDURE — 36415 COLL VENOUS BLD VENIPUNCTURE: CPT | Performed by: NURSE PRACTITIONER

## 2020-11-17 RX ADMIN — DENOSUMAB 60 MG: 60 INJECTION SUBCUTANEOUS at 15:02

## 2020-12-14 ENCOUNTER — OFFICE VISIT (OUTPATIENT)
Dept: SURGERY | Facility: CLINIC | Age: 70
End: 2020-12-14

## 2020-12-14 VITALS
HEART RATE: 83 BPM | SYSTOLIC BLOOD PRESSURE: 110 MMHG | TEMPERATURE: 98.3 F | HEIGHT: 70 IN | WEIGHT: 211.2 LBS | BODY MASS INDEX: 30.24 KG/M2 | DIASTOLIC BLOOD PRESSURE: 70 MMHG

## 2020-12-14 DIAGNOSIS — Z12.31 ENCOUNTER FOR SCREENING MAMMOGRAM FOR MALIGNANT NEOPLASM OF BREAST: Primary | ICD-10-CM

## 2020-12-14 PROCEDURE — 99213 OFFICE O/P EST LOW 20 MIN: CPT | Performed by: NURSE PRACTITIONER

## 2020-12-14 RX ORDER — PREDNISONE 10 MG/1
TABLET ORAL AS NEEDED
COMMUNITY
Start: 2020-12-10 | End: 2021-12-20

## 2020-12-14 RX ORDER — FEXOFENADINE HYDROCHLORIDE 180 MG/1
180 TABLET, FILM COATED ORAL AS NEEDED
COMMUNITY
Start: 2020-12-10 | End: 2022-10-24

## 2020-12-14 NOTE — PATIENT INSTRUCTIONS
"BMI for Adults  What is BMI?  Body mass index (BMI) is a number that is calculated from a person's weight and height. BMI can help estimate how much of a person's weight is composed of fat. BMI does not measure body fat directly. Rather, it is an alternative to procedures that directly measure body fat, which can be difficult and expensive.  BMI can help identify people who may be at higher risk for certain medical problems.  What are BMI measurements used for?  BMI is used as a screening tool to identify possible weight problems. It helps determine whether a person is obese, overweight, a healthy weight, or underweight.  BMI is useful for:  · Identifying a weight problem that may be related to a medical condition or may increase the risk for medical problems.  · Promoting changes, such as changes in diet and exercise, to help reach a healthy weight. BMI screening can be repeated to see if these changes are working.  How is BMI calculated?  BMI involves measuring your weight in relation to your height. Both height and weight are measured, and the BMI is calculated from those numbers. This can be done either in English (U.S[place] or metric measurements. Note that charts and online BMI calculators are available to help you find your BMI quickly and easily without having to do these calculations yourself.  To calculate your BMI in English (U.S.) measurements:    1. Measure your weight in pounds (lb).  2. Multiply the number of pounds by 703.  ? For example, for a person who weighs 180 lb, multiply that number by 703, which equals 126,540.  3. Measure your height in inches. Then multiply that number by itself to get a measurement called \"inches squared.\"  ? For example, for a person who is 70 inches tall, the \"inches squared\" measurement is 70 inches x 70 inches, which equals 4,900 inches squared.  4. Divide the total from step 2 (number of lb x 703) by the total from step 3 (inches squared): 126,540 ÷ 4,900 = 25.8. This is " "your BMI.  To calculate your BMI in metric measurements:  1. Measure your weight in kilograms (kg).  2. Measure your height in meters (m). Then multiply that number by itself to get a measurement called \"meters squared.\"  ? For example, for a person who is 1.75 m tall, the \"meters squared\" measurement is 1.75 m x 1.75 m, which is equal to 3.1 meters squared.  3. Divide the number of kilograms (your weight) by the meters squared number. In this example: 70 ÷ 3.1 = 22.6. This is your BMI.  What do the results mean?  BMI charts are used to identify whether you are underweight, normal weight, overweight, or obese. The following guidelines will be used:  · Underweight: BMI less than 18.5.  · Normal weight: BMI between 18.5 and 24.9.  · Overweight: BMI between 25 and 29.9.  · Obese: BMI of 30 or above.  Keep these notes in mind:  · Weight includes both fat and muscle, so someone with a muscular build, such as an athlete, may have a BMI that is higher than 24.9. In cases like these, BMI is not an accurate measure of body fat.  · To determine if excess body fat is the cause of a BMI of 25 or higher, further assessments may need to be done by a health care provider.  · BMI is usually interpreted in the same way for men and women.  Where to find more information  For more information about BMI, including tools to quickly calculate your BMI, go to these websites:  · Centers for Disease Control and Prevention: www.cdc.gov  · American Heart Association: www.heart.org  · National Heart, Lung, and Blood [place]: www.nhlbi.nih.gov  Summary  · Body mass index (BMI) is a number that is calculated from a person's weight and height.  · BMI may help estimate how much of a person's weight is composed of fat. BMI can help identify those who may be at higher risk for certain medical problems.  · BMI can be measured using English measurements or metric measurements.  · BMI charts are used to identify whether you are underweight, normal " weight, overweight, or obese.  This information is not intended to replace advice given to you by your health care provider. Make sure you discuss any questions you have with your health care provider.  Document Revised: 09/09/2020 Document Reviewed: 07/17/2020  Elsevier Patient Education © 2020 [employer] Inc.

## 2020-12-14 NOTE — PROGRESS NOTES
Chief Complaint: This is a 70 y.o. woman seen in consultation for routine followup benign breast disease    History of Present Illness:  Patient has noted no new masses, skin changes, nipple discharge, nipple changes prior to her most recent imaging.  Her most recent imaging includes the following:     PROCEDURE: Bilateral screening mammogram with 3-D tomosynthesis   with CAD   REASON FOR EXAM: Screening mammography   FINDINGS: Comparison study dated 12/2/2019, 11/16/2018, 5/1/2018,   and 10/27/2017. No interval change in appearance of breast   parenchyma. No suspicious mass or malignant type   microcalcifications . No skin thickening or retraction. Clip   marks site of prior left breast biopsy..   Parenchymal pattern: Almost complete fatty involutional changes   of breast   IMPRESSION:   No mammographic evidence of malignancy.   BI-RADS category 2: Benign findings.   Recommendation: Annual mammography   Electronically signed by: Constantino Renteria MD 12/8/2020 9:32 AM CST   Workstation: [id number]      She has past history of benign breast biopsy.  She has family history of breast cancer in 2 aunts.  She does not have personal or family history of breast cancer. She is post menopausal and takes no hormones.       She is here for evaluation.    Past Medical History:   Diagnosis Date   • Acute bronchitis    • Acute maxillary sinusitis    • Acute pharyngitis    • Allergic rhinitis    • Astigmatism    • Derangement of meniscus 9/19/2009   • Food allergy     spinach   • Hypermetropia    • Loose body of knee 9/19/2009   • Need for immunization against influenza    • Osteoporosis 7/10/2014   • Presbyopia    • Vulvovaginitis      Past Surgical History:   Procedure Laterality Date   • BREAST BIOPSY     • COLONOSCOPY N/A 12/19/2019    Procedure: COLONOSCOPY;  Surgeon: Braulio Bowen DO;  Location: [facility] ENDOSCOPY;  Service: Gastroenterology   • LAPAROSCOPIC CHOLECYSTECTOMY     • TONSILLECTOMY     • WRIST SURGERY       Prior  to Admission medications    Medication Sig Start Date End Date Taking? Authorizing Provider   Ascorbic Acid (DOMINICK-C PO) Take  by mouth.   Yes Richie Barney MD   atorvastatin (LIPITOR) 20 MG tablet Take 20 mg by mouth Every Night. 3/2/18  Yes Richie Barney MD   Cholecalciferol (VITAMIN D3 PO) Take 1 tablet by mouth. vit D3 & K-berberine-hops     Yes Richie Barney MD   denosumab (PROLIA) 60 MG/ML solution syringe Inject  under the skin into the appropriate area as directed 1 (One) Time.   Yes Richie Barney MD   denosumab (PROLIA) 60 MG/ML solution syringe Inject 1 mL under the skin into the appropriate area as directed Every 6 (Six) Months.   Yes Richie Barney MD   Misc Natural Products (OSTEO BI-FLEX ADV DOUBLE ST PO) Take  by mouth.   Yes Richie Barney MD   Multiple Vitamins-Minerals (MULTIVITAMIN ADULT PO) Take 1 tablet by mouth Daily.   Yes Richie Barney MD   Allegra Allergy 180 MG tablet Take 180 mg by mouth As Needed. 12/10/20   Richie Barney MD   chlorpheniramine (CHLOR-TRIMETON) 4 MG tablet Take 4 mg by mouth Daily.    Richie Barney MD   diphenhydrAMINE (BENADRYL) 25 mg capsule Take 25 mg by mouth Every Night.    Richie Barney MD   fluconazole (DIFLUCAN) 150 MG tablet Take 1 tablet by mouth As Needed. 8/15/17   Richie Barney MD   ondansetron (ZOFRAN) 4 MG tablet As Needed. 8/21/18   Richie Barney MD   predniSONE (DELTASONE) 10 MG tablet As Needed. 12/10/20   Richie Barney MD     No Known Allergies  Family History   Problem Relation Age of Onset   • Breast cancer Maternal Aunt      Social History     Socioeconomic History   • Marital status:      Spouse name: Not on file   • Number of children: Not on file   • Years of education: Not on file   • Highest education level: Not on file   Tobacco Use   • Smoking status: Former Smoker   • Smokeless tobacco: Never Used   Substance and Sexual Activity   •  "Alcohol use: Yes     Comment: occassionally   • Drug use: No   • Sexual activity: Defer     Review of Systems   Constitutional: Negative for appetite change, chills, fever and unexpected weight change.   HENT: Negative for hearing loss and trouble swallowing.    Eyes: Negative for visual disturbance.   Respiratory: Negative for apnea, cough, choking, chest tightness, shortness of breath, wheezing and stridor.    Cardiovascular: Negative for chest pain, palpitations and leg swelling.   Gastrointestinal: Negative for abdominal distention, abdominal pain, blood in stool, constipation, diarrhea, nausea and vomiting.   Endocrine: Negative for cold intolerance and heat intolerance.   Genitourinary: Negative for difficulty urinating, dysuria, frequency, hematuria and urgency.   Musculoskeletal: Positive for arthralgias (right knee). Negative for back pain, myalgias and neck pain.   Skin: Positive for rash (\"got bitten by insects last week in [place]\").   Neurological: Negative for seizures, syncope and numbness.   Hematological: Negative for adenopathy.   Psychiatric/Behavioral: Negative for suicidal ideas. The patient is not nervous/anxious and is not hyperactive.      Physical Exam  Vitals signs reviewed.   Constitutional:       Appearance: Normal appearance.   HENT:      Head: Normocephalic and atraumatic.   Eyes:      Extraocular Movements: Extraocular movements intact.      Conjunctiva/sclera: Conjunctivae normal.      Pupils: Pupils are equal, round, and reactive to light.   Neck:      Musculoskeletal: Normal range of motion and neck supple.   Cardiovascular:      Rate and Rhythm: Normal rate and regular rhythm.      Pulses: Normal pulses.   Pulmonary:      Effort: Pulmonary effort is normal.      Breath sounds: Normal breath sounds.   Chest:      Chest wall: No mass.      Breasts:         Right: Normal. No swelling, bleeding, inverted nipple, mass, nipple discharge, skin change or tenderness.         Left: Normal. No " swelling, bleeding, inverted nipple, mass, nipple discharge, skin change or tenderness.   Abdominal:      General: Abdomen is flat. There is no distension.      Palpations: Abdomen is soft.      Tenderness: There is no abdominal tenderness.   Musculoskeletal: Normal range of motion.   Lymphadenopathy:      Upper Body:      Right upper body: No supraclavicular or axillary adenopathy.      Left upper body: No supraclavicular or axillary adenopathy.   Skin:     General: Skin is warm.      Findings: Rash present.   Neurological:      General: No focal deficit present.      Mental Status: She is alert and oriented to person, place, and time.   Psychiatric:         Mood and Affect: Mood normal.         Behavior: Behavior normal.         Thought Content: Thought content normal.         Judgment: Judgment normal.       Vitals:    12/14/20 0931   BP: 110/70   Pulse: 83   Temp: 98.3 °F (36.8 °C)     Assessment:    Diagnoses and all orders for this visit:    1. Encounter for screening mammogram for malignant neoplasm of breast (Primary)  -     Mammo Screening Digital Tomosynthesis Bilateral With CAD; Future        Plan:  1. Continue annual mammography and exam  2. Encourage self breast exam.                This document has been electronically signed by ANDREA Silveira on December 14, 2020 11:27 CST

## 2021-03-15 ENCOUNTER — BULK ORDERING (OUTPATIENT)
Dept: CASE MANAGEMENT | Facility: OTHER | Age: 71
End: 2021-03-15

## 2021-03-15 DIAGNOSIS — Z23 IMMUNIZATION DUE: ICD-10-CM

## 2021-05-05 DIAGNOSIS — M81.0 SENILE OSTEOPOROSIS: Primary | ICD-10-CM

## 2021-05-13 ENCOUNTER — APPOINTMENT (OUTPATIENT)
Dept: ONCOLOGY | Facility: HOSPITAL | Age: 71
End: 2021-05-13

## 2021-05-13 ENCOUNTER — LAB (OUTPATIENT)
Dept: ONCOLOGY | Facility: HOSPITAL | Age: 71
End: 2021-05-13

## 2021-05-13 DIAGNOSIS — M81.0 AGE-RELATED OSTEOPOROSIS WITHOUT CURRENT PATHOLOGICAL FRACTURE: ICD-10-CM

## 2021-05-13 PROCEDURE — 80053 COMPREHEN METABOLIC PANEL: CPT | Performed by: NURSE PRACTITIONER

## 2021-05-13 PROCEDURE — 36415 COLL VENOUS BLD VENIPUNCTURE: CPT | Performed by: NURSE PRACTITIONER

## 2021-05-13 PROCEDURE — 84100 ASSAY OF PHOSPHORUS: CPT | Performed by: NURSE PRACTITIONER

## 2021-05-13 PROCEDURE — 83735 ASSAY OF MAGNESIUM: CPT | Performed by: NURSE PRACTITIONER

## 2021-05-18 ENCOUNTER — TELEPHONE (OUTPATIENT)
Dept: ORTHOPEDIC SURGERY | Facility: CLINIC | Age: 71
End: 2021-05-18

## 2021-05-18 ENCOUNTER — INFUSION (OUTPATIENT)
Dept: ONCOLOGY | Facility: HOSPITAL | Age: 71
End: 2021-05-18

## 2021-05-18 VITALS — HEART RATE: 71 BPM | SYSTOLIC BLOOD PRESSURE: 113 MMHG | DIASTOLIC BLOOD PRESSURE: 60 MMHG | RESPIRATION RATE: 18 BRPM

## 2021-05-18 DIAGNOSIS — M81.0 SENILE OSTEOPOROSIS: Primary | ICD-10-CM

## 2021-05-18 PROCEDURE — 25010000002 DENOSUMAB 60 MG/ML SOLUTION PREFILLED SYRINGE: Performed by: NURSE PRACTITIONER

## 2021-05-18 PROCEDURE — 96372 THER/PROPH/DIAG INJ SC/IM: CPT | Performed by: NURSE PRACTITIONER

## 2021-05-18 RX ADMIN — DENOSUMAB 60 MG: 60 INJECTION SUBCUTANEOUS at 14:00

## 2021-05-18 NOTE — TELEPHONE ENCOUNTER
KAN      Pt CALLED WANTING TO KNOW IF WE WILL BE SETTING HER UP FOR ANOTHER BONE DENSITY HER LAST BONE DENSITY WAS DONE 9/11/2020

## 2021-09-20 ENCOUNTER — TELEPHONE (OUTPATIENT)
Dept: ORTHOPEDIC SURGERY | Facility: CLINIC | Age: 71
End: 2021-09-20

## 2021-09-20 DIAGNOSIS — M81.0 SENILE OSTEOPOROSIS: Primary | ICD-10-CM

## 2021-09-20 NOTE — TELEPHONE ENCOUNTER
KAN.  PATIENT IS ASKING FOR A BONE DENSITY ORDER.  SHE HAD THE LAST ONE DONE ON 09/11/2020.  SHE HAD BEEN TOLD THAT WE MIGHT SKIP THIS YEAR.  PATIENT IS CONFUSED AND NEEDS A CALL BACK [phone]

## 2021-09-24 ENCOUNTER — TELEPHONE (OUTPATIENT)
Dept: ORTHOPEDIC SURGERY | Facility: CLINIC | Age: 71
End: 2021-09-24

## 2021-09-24 NOTE — TELEPHONE ENCOUNTER
Spoke with patient and informed her that the order was placed on 9/20/2021. Bone scans are done at the [facility]. She states she will wait until next week and if she has not heard from them at that time, she will call and get scheduled.

## 2021-09-24 NOTE — TELEPHONE ENCOUNTER
----- Message from Ayleen Ruiz sent at 9/24/2021  8:21 AM CDT -----  Regarding: RE: bone density  Contact: [phone]  I usually get a yearly bone density scan, but have not been contacted to set up an appointment.   Thank you.  Ayleen Ruiz    ----- Message -----  From: MADISON BERG  Sent: 9/20/21, 12:27 PM  To: Ayleen Ruiz  Subject: bone density    Yes if you are on Prolia I typically get an annual bone density study, although it is not required in some patients do prefer to wait for 2 years.  Either is fine.  Thanks.

## 2021-11-11 ENCOUNTER — APPOINTMENT (OUTPATIENT)
Dept: LAB | Facility: HOSPITAL | Age: 71
End: 2021-11-11

## 2021-11-18 ENCOUNTER — APPOINTMENT (OUTPATIENT)
Dept: ONCOLOGY | Facility: HOSPITAL | Age: 71
End: 2021-11-18

## 2021-11-30 ENCOUNTER — LAB (OUTPATIENT)
Dept: LAB | Facility: HOSPITAL | Age: 71
End: 2021-11-30

## 2021-11-30 PROCEDURE — 83735 ASSAY OF MAGNESIUM: CPT | Performed by: NURSE PRACTITIONER

## 2021-11-30 PROCEDURE — 80053 COMPREHEN METABOLIC PANEL: CPT | Performed by: NURSE PRACTITIONER

## 2021-11-30 PROCEDURE — 84100 ASSAY OF PHOSPHORUS: CPT | Performed by: NURSE PRACTITIONER

## 2021-12-03 ENCOUNTER — INFUSION (OUTPATIENT)
Dept: ONCOLOGY | Facility: HOSPITAL | Age: 71
End: 2021-12-03

## 2021-12-03 DIAGNOSIS — M81.0 SENILE OSTEOPOROSIS: Primary | ICD-10-CM

## 2021-12-03 PROCEDURE — 25010000002 DENOSUMAB 60 MG/ML SOLUTION PREFILLED SYRINGE: Performed by: NURSE PRACTITIONER

## 2021-12-03 PROCEDURE — 96372 THER/PROPH/DIAG INJ SC/IM: CPT | Performed by: NURSE PRACTITIONER

## 2021-12-03 RX ADMIN — DENOSUMAB 60 MG: 60 INJECTION SUBCUTANEOUS at 11:26

## 2021-12-20 ENCOUNTER — OFFICE VISIT (OUTPATIENT)
Dept: SURGERY | Facility: CLINIC | Age: 71
End: 2021-12-20

## 2021-12-20 VITALS
TEMPERATURE: 97.8 F | DIASTOLIC BLOOD PRESSURE: 72 MMHG | WEIGHT: 159.6 LBS | BODY MASS INDEX: 23.64 KG/M2 | HEART RATE: 81 BPM | HEIGHT: 69 IN | SYSTOLIC BLOOD PRESSURE: 124 MMHG

## 2021-12-20 DIAGNOSIS — Z12.31 ENCOUNTER FOR SCREENING MAMMOGRAM FOR MALIGNANT NEOPLASM OF BREAST: Primary | ICD-10-CM

## 2021-12-20 PROCEDURE — 99213 OFFICE O/P EST LOW 20 MIN: CPT | Performed by: NURSE PRACTITIONER

## 2021-12-20 RX ORDER — ATORVASTATIN CALCIUM 20 MG/1
1 TABLET, FILM COATED ORAL NIGHTLY
COMMUNITY
Start: 2021-09-10 | End: 2021-12-20 | Stop reason: SDUPTHER

## 2021-12-20 NOTE — PATIENT INSTRUCTIONS
"BMI for Adults  What is BMI?  Body mass index (BMI) is a number that is calculated from a person's weight and height. BMI can help estimate how much of a person's weight is composed of fat. BMI does not measure body fat directly. Rather, it is an alternative to procedures that directly measure body fat, which can be difficult and expensive.  BMI can help identify people who may be at higher risk for certain medical problems.  What are BMI measurements used for?  BMI is used as a screening tool to identify possible weight problems. It helps determine whether a person is obese, overweight, a healthy weight, or underweight.  BMI is useful for:  · Identifying a weight problem that may be related to a medical condition or may increase the risk for medical problems.  · Promoting changes, such as changes in diet and exercise, to help reach a healthy weight. BMI screening can be repeated to see if these changes are working.  How is BMI calculated?  BMI involves measuring your weight in relation to your height. Both height and weight are measured, and the BMI is calculated from those numbers. This can be done either in English (U.S.) or metric measurements. Note that charts and online BMI calculators are available to help you find your BMI quickly and easily without having to do these calculations yourself.  To calculate your BMI in English (U.S.) measurements:    1. Measure your weight in pounds (lb).  2. Multiply the number of pounds by 703.  ? For example, for a person who weighs 180 lb, multiply that number by 703, which equals 126,540.  3. Measure your height in inches. Then multiply that number by itself to get a measurement called \"inches squared.\"  ? For example, for a person who is 70 inches tall, the \"inches squared\" measurement is 70 inches x 70 inches, which equals 4,900 inches squared.  4. Divide the total from step 2 (number of lb x 703) by the total from step 3 (inches squared): 126,540 ÷ 4,900 = 25.8. This is " "your BMI.    To calculate your BMI in metric measurements:  1. Measure your weight in kilograms (kg).  2. Measure your height in meters (m). Then multiply that number by itself to get a measurement called \"meters squared.\"  ? For example, for a person who is 1.75 m tall, the \"meters squared\" measurement is 1.75 m x 1.75 m, which is equal to 3.1 meters squared.  3. Divide the number of kilograms (your weight) by the meters squared number. In this example: 70 ÷ 3.1 = 22.6. This is your BMI.  What do the results mean?  BMI charts are used to identify whether you are underweight, normal weight, overweight, or obese. The following guidelines will be used:  · Underweight: BMI less than 18.5.  · Normal weight: BMI between 18.5 and 24.9.  · Overweight: BMI between 25 and 29.9.  · Obese: BMI of 30 or above.  Keep these notes in mind:  · Weight includes both fat and muscle, so someone with a muscular build, such as an athlete, may have a BMI that is higher than 24.9. In cases like these, BMI is not an accurate measure of body fat.  · To determine if excess body fat is the cause of a BMI of 25 or higher, further assessments may need to be done by a health care provider.  · BMI is usually interpreted in the same way for men and women.  Where to find more information  For more information about BMI, including tools to quickly calculate your BMI, go to these websites:  · Centers for Disease Control and Prevention: www.cdc.gov  · American Heart Association: www.heart.org  · National Heart, Lung, and Blood [place]: www.nhlbi.nih.gov  Summary  · Body mass index (BMI) is a number that is calculated from a person's weight and height.  · BMI may help estimate how much of a person's weight is composed of fat. BMI can help identify those who may be at higher risk for certain medical problems.  · BMI can be measured using English measurements or metric measurements.  · BMI charts are used to identify whether you are underweight, normal " weight, overweight, or obese.  This information is not intended to replace advice given to you by your health care provider. Make sure you discuss any questions you have with your health care provider.  Document Revised: 09/09/2020 Document Reviewed: 07/17/2020  Elsevier Patient Education © 2021 Elsevier Inc.

## 2021-12-20 NOTE — PROGRESS NOTES
"Chief Complaint  Follow-up (Recheck Breast and Mammogram)    Subjective          Ayleen Ruiz presents to [facility]  History of Present Illness  Ms. Ayleen Ruiz presents today to discuss recent mammography results and for annual breast exam. She denies any changes with her breasts including lumps, masses, skin changes, nipple discharge or lymph node enlargement. She does have family history of breast cancer in 2 aunts.  She does not have personal or family history of ovarian cancer. She is post menopausal and does not take any hormones.  She has had benign breast biopsy in past.        Study Result  Narrative & Impression   MAMMOGRAM: Screening.   HISTORY: Screening mammogram.   COMPARISON: December 7 , 2020.   FINDINGS:   Bilateral low dose digital mammography was performed.   This study was interpreted with the assistance of CAD (computer   aided diagnosis. 3-D Mammotomosynthesis was obtained.   Parenchymal pattern: Scattered areas of fibroglandular density   There is no evidence of a newly appearing discrete mass lesion,   area of architectural distortion, or abnormal microcalcifications   to suspect the presence of malignancy.   Stable benign mammographic exam.   IMPRESSION:   1. No mammographic evidence of malignancy - no interval change.   Follow-up recommendations: annual mammographic surveillance.   BIRADS: 2, Benign finding(s)   Electronically signed by: Donny Gale MD 12/14/2021 10:34 AM   CST Workstation: [id number]         Objective   Vital Signs:   /72   Pulse 81   Temp 97.8 °F (36.6 °C) (Infrared)   Ht 175.3 cm (69\")   Wt 72.4 kg (159 lb 9.6 oz)   BMI 23.57 kg/m²     Physical Exam  Vitals reviewed.   Constitutional:       General: She is not in acute distress.     Appearance: Normal appearance. She is not ill-appearing, toxic-appearing or diaphoretic.   HENT:      Head: Normocephalic and atraumatic.   Eyes:      General: No scleral " icterus.  Cardiovascular:      Rate and Rhythm: Normal rate.   Pulmonary:      Effort: Pulmonary effort is normal. No respiratory distress.      Breath sounds: Normal breath sounds.   Chest:   Breasts: Breasts are symmetrical.      Right: Normal. No swelling, bleeding, inverted nipple, mass, nipple discharge, skin change, tenderness, axillary adenopathy or supraclavicular adenopathy.      Left: Normal. No swelling, bleeding, inverted nipple, mass, nipple discharge, skin change, tenderness, axillary adenopathy or supraclavicular adenopathy.       Lymphadenopathy:      Upper Body:      Right upper body: No supraclavicular or axillary adenopathy.      Left upper body: No supraclavicular or axillary adenopathy.   Skin:     General: Skin is warm and dry.      Findings: No erythema.   Neurological:      General: No focal deficit present.      Mental Status: She is alert and oriented to person, place, and time.   Psychiatric:         Mood and Affect: Mood normal.         Behavior: Behavior normal.         Thought Content: Thought content normal.         Judgment: Judgment normal.        Result Review :       Data reviewed: Radiologic studies mammography           Assessment and Plan    Diagnoses and all orders for this visit:    1. Encounter for screening mammogram for malignant neoplasm of breast (Primary)  -     Mammo Screening Digital Tomosynthesis Bilateral With CAD; Future        I spent 22 minutes caring for Ayleen on this date of service. This time includes time spent by me in the following activities:preparing for the visit, reviewing tests, obtaining and/or reviewing a separately obtained history, performing a medically appropriate examination and/or evaluation , documenting information in the medical record and care coordination  Follow Up   Return in about 1 year (around 12/20/2022), or if symptoms worsen or fail to improve.  Continue annual mammography and breast exam unless concerns arise sooner. Encouraged  to continue self breast exams as discussed.     Patient was given instructions and counseling regarding her condition or for health maintenance advice. Please see specific information pulled into the AVS if appropriate.                 This document has been electronically signed by ANDREA Silveira on December 20, 2021 10:00 CST

## 2022-05-24 DIAGNOSIS — M81.0 AGE-RELATED OSTEOPOROSIS WITHOUT CURRENT PATHOLOGICAL FRACTURE: Primary | ICD-10-CM

## 2022-06-06 ENCOUNTER — APPOINTMENT (OUTPATIENT)
Dept: ONCOLOGY | Facility: HOSPITAL | Age: 72
End: 2022-06-06

## 2022-06-14 ENCOUNTER — LAB (OUTPATIENT)
Dept: LAB | Facility: HOSPITAL | Age: 72
End: 2022-06-14

## 2022-06-14 DIAGNOSIS — M81.0 AGE-RELATED OSTEOPOROSIS WITHOUT CURRENT PATHOLOGICAL FRACTURE: ICD-10-CM

## 2022-06-14 LAB
25(OH)D3 SERPL-MCNC: 60 NG/ML (ref 30–100)
CALCIUM SPEC-SCNC: 9.8 MG/DL (ref 8.6–10.5)

## 2022-06-14 PROCEDURE — 82310 ASSAY OF CALCIUM: CPT

## 2022-06-14 PROCEDURE — 82306 VITAMIN D 25 HYDROXY: CPT

## 2022-06-20 ENCOUNTER — APPOINTMENT (OUTPATIENT)
Dept: ONCOLOGY | Facility: HOSPITAL | Age: 72
End: 2022-06-20

## 2022-06-22 ENCOUNTER — INFUSION (OUTPATIENT)
Dept: ONCOLOGY | Facility: HOSPITAL | Age: 72
End: 2022-06-22

## 2022-06-22 VITALS — HEART RATE: 86 BPM | SYSTOLIC BLOOD PRESSURE: 137 MMHG | DIASTOLIC BLOOD PRESSURE: 65 MMHG

## 2022-06-22 DIAGNOSIS — M81.0 AGE-RELATED OSTEOPOROSIS WITHOUT CURRENT PATHOLOGICAL FRACTURE: Primary | ICD-10-CM

## 2022-06-22 PROCEDURE — 25010000002 DENOSUMAB 60 MG/ML SOLUTION PREFILLED SYRINGE: Performed by: NURSE PRACTITIONER

## 2022-06-22 PROCEDURE — 96372 THER/PROPH/DIAG INJ SC/IM: CPT | Performed by: NURSE PRACTITIONER

## 2022-06-22 RX ADMIN — DENOSUMAB 60 MG: 60 INJECTION SUBCUTANEOUS at 11:19

## 2022-09-29 ENCOUNTER — TELEPHONE (OUTPATIENT)
Dept: ORTHOPEDIC SURGERY | Facility: CLINIC | Age: 72
End: 2022-09-29

## 2022-09-29 DIAGNOSIS — M81.0 SENILE OSTEOPOROSIS: ICD-10-CM

## 2022-09-29 DIAGNOSIS — M81.0 AGE-RELATED OSTEOPOROSIS WITHOUT CURRENT PATHOLOGICAL FRACTURE: Primary | ICD-10-CM

## 2022-10-24 PROCEDURE — 87635 SARS-COV-2 COVID-19 AMP PRB: CPT | Performed by: FAMILY MEDICINE

## 2022-12-13 ENCOUNTER — LAB (OUTPATIENT)
Dept: LAB | Facility: HOSPITAL | Age: 72
End: 2022-12-13

## 2022-12-13 DIAGNOSIS — M81.0 AGE-RELATED OSTEOPOROSIS WITHOUT CURRENT PATHOLOGICAL FRACTURE: Primary | ICD-10-CM

## 2022-12-13 LAB — CALCIUM SPEC-SCNC: 10.4 MG/DL (ref 8.6–10.5)

## 2022-12-13 PROCEDURE — 82310 ASSAY OF CALCIUM: CPT | Performed by: NURSE PRACTITIONER

## 2022-12-13 PROCEDURE — 36415 COLL VENOUS BLD VENIPUNCTURE: CPT | Performed by: NURSE PRACTITIONER

## 2022-12-22 ENCOUNTER — INFUSION (OUTPATIENT)
Dept: ONCOLOGY | Facility: HOSPITAL | Age: 72
End: 2022-12-22

## 2022-12-22 VITALS
RESPIRATION RATE: 18 BRPM | SYSTOLIC BLOOD PRESSURE: 145 MMHG | HEART RATE: 95 BPM | TEMPERATURE: 98 F | DIASTOLIC BLOOD PRESSURE: 67 MMHG

## 2022-12-22 DIAGNOSIS — M81.0 AGE-RELATED OSTEOPOROSIS WITHOUT CURRENT PATHOLOGICAL FRACTURE: Primary | ICD-10-CM

## 2022-12-22 PROCEDURE — 96372 THER/PROPH/DIAG INJ SC/IM: CPT | Performed by: NURSE PRACTITIONER

## 2022-12-22 PROCEDURE — 25010000002 DENOSUMAB 60 MG/ML SOLUTION PREFILLED SYRINGE: Performed by: NURSE PRACTITIONER

## 2022-12-22 RX ADMIN — DENOSUMAB 60 MG: 60 INJECTION SUBCUTANEOUS at 08:04

## 2023-01-16 ENCOUNTER — OFFICE VISIT (OUTPATIENT)
Dept: SURGERY | Facility: CLINIC | Age: 73
End: 2023-01-16
Payer: MEDICARE

## 2023-01-16 VITALS
DIASTOLIC BLOOD PRESSURE: 72 MMHG | TEMPERATURE: 98.4 F | SYSTOLIC BLOOD PRESSURE: 114 MMHG | OXYGEN SATURATION: 98 % | WEIGHT: 188.2 LBS | HEIGHT: 69 IN | HEART RATE: 96 BPM | BODY MASS INDEX: 27.88 KG/M2

## 2023-01-16 DIAGNOSIS — Z12.31 ENCOUNTER FOR SCREENING MAMMOGRAM FOR MALIGNANT NEOPLASM OF BREAST: Primary | ICD-10-CM

## 2023-01-16 PROCEDURE — 99213 OFFICE O/P EST LOW 20 MIN: CPT | Performed by: NURSE PRACTITIONER

## 2023-01-16 RX ORDER — DEXTROMETHORPHAN HYDROBROMIDE AND PROMETHAZINE HYDROCHLORIDE 15; 6.25 MG/5ML; MG/5ML
SYRUP ORAL
COMMUNITY
Start: 2023-01-04

## 2023-01-16 NOTE — PROGRESS NOTES
"Chief Complaint  Follow-up (Recheck Breast and Mammogram)    Subjective        Ayleen Ruiz presents to [facility] [place]     History of Present Illness  Ms. Ayleen Ruiz presents today to discuss recent screening mammography results and for annual breast exam. She denies any changes with her breasts including lumps, masses, skin changes, nipple discharge or lymph node enlargement. She does have family history of breast cancer in 2 maternal aunts.  She does not have personal or family history of ovarian cancer. She is post menopausal and does not take any hormones.  She has had benign breast biopsy in past.      Study Result  Narrative & Impression   PROCEDURE: Bilateral digital screening mammogram   HISTORY: Routine screening mammography.   COMPARISON: 12/13/2021-10/27/2017   NOTE: Computer-aided detection was utilized during this exam.   Digital breast tomosynthesis was performed.   FINDINGS:   CC and MLO views are obtained of both breasts.   Parenchymal pattern: There are scattered areas of fibroglandular   density.   No suspicious mass, architectural distortion or suspicious   microcalcifications. Stable clip noted in the left breast.   IMPRESSION:   No mammographic evidence of malignancy. In the absence of   suspicious clinical or physical findings, routine follow-up   mammography is recommended in one year.   BIRADS Category 2: Benign findings   Electronically signed by: Fernando Patino MD 12/15/2022 9:27 AM   CST Workstation: [id number]         Objective   Vital Signs:  /72 (BP Location: Left arm, Patient Position: Sitting, Cuff Size: Adult) Comment (BP Location): Left upper arm  Pulse 96   Temp 98.4 °F (36.9 °C) (Temporal)   Ht 175.3 cm (69\")   Wt 85.4 kg (188 lb 3.2 oz)   SpO2 98%   BMI 27.79 kg/m²   Estimated body mass index is 27.79 kg/m² as calculated from the following:    Height as of this encounter: 175.3 cm (69\").    Weight as " of this encounter: 85.4 kg (188 lb 3.2 oz).       BMI is >= 25 and <30. (Overweight) The following options were offered after discussion;: weight loss educational material (shared in after visit summary)      Physical Exam  Vitals reviewed.   Constitutional:       General: She is not in acute distress.     Appearance: Normal appearance. She is not ill-appearing, toxic-appearing or diaphoretic.   HENT:      Head: Normocephalic and atraumatic.   Eyes:      General: No scleral icterus.  Cardiovascular:      Rate and Rhythm: Normal rate.   Pulmonary:      Effort: Pulmonary effort is normal. No respiratory distress.      Breath sounds: Normal breath sounds.   Chest:   Breasts:     Breasts are symmetrical.      Right: Normal. No swelling, bleeding, inverted nipple, mass, nipple discharge, skin change or tenderness.      Left: Normal. No swelling, bleeding, inverted nipple, mass, nipple discharge, skin change or tenderness.   Lymphadenopathy:      Upper Body:      Right upper body: No supraclavicular or axillary adenopathy.      Left upper body: No supraclavicular or axillary adenopathy.   Skin:     General: Skin is warm and dry.      Findings: No erythema.   Neurological:      General: No focal deficit present.      Mental Status: She is alert and oriented to person, place, and time.   Psychiatric:         Mood and Affect: Mood normal.         Behavior: Behavior normal.         Thought Content: Thought content normal.         Judgment: Judgment normal.        Result Review :          BMI is >= 25 and <30. (Overweight) The following options were offered after discussion;: weight loss educational material (shared in after visit summary)           Assessment and Plan   Diagnoses and all orders for this visit:    1. Encounter for screening mammogram for malignant neoplasm of breast (Primary)  -     Mammo Screening Digital Tomosynthesis Bilateral With CAD; Future             Follow Up      Patient is to continue annual  screening mammography and breast exam unless concerns arise sooner. She is encouraged to continue routine self breast exams.      Return in about 1 year (around 1/16/2024), or if symptoms worsen or fail to improve.  Patient was given instructions and counseling regarding her condition or for health maintenance advice. Please see specific information pulled into the AVS if appropriate.                 This document has been electronically signed by ANDREA Silveira on January 16, 2023 10:09 CST

## (undated) DEVICE — SINGLE-USE BIOPSY FORCEPS: Brand: RADIAL JAW 4

## (undated) DEVICE — CANN SMPL SOFTECH BIFLO ETCO2 A/M 7FT